# Patient Record
Sex: FEMALE | Employment: UNEMPLOYED | URBAN - METROPOLITAN AREA
[De-identification: names, ages, dates, MRNs, and addresses within clinical notes are randomized per-mention and may not be internally consistent; named-entity substitution may affect disease eponyms.]

---

## 2023-10-02 ENCOUNTER — WALK IN (OUTPATIENT)
Dept: URGENT CARE | Age: 38
End: 2023-10-02

## 2023-10-02 VITALS
RESPIRATION RATE: 18 BRPM | WEIGHT: 115 LBS | TEMPERATURE: 98 F | OXYGEN SATURATION: 99 % | DIASTOLIC BLOOD PRESSURE: 63 MMHG | HEART RATE: 68 BPM | SYSTOLIC BLOOD PRESSURE: 94 MMHG

## 2023-10-02 DIAGNOSIS — M62.830 SPASM OF BACK MUSCLES: Primary | ICD-10-CM

## 2023-10-02 PROCEDURE — 99203 OFFICE O/P NEW LOW 30 MIN: CPT | Performed by: NURSE PRACTITIONER

## 2023-10-02 RX ORDER — CYCLOBENZAPRINE HCL 5 MG
5 TABLET ORAL 3 TIMES DAILY PRN
Qty: 15 TABLET | Refills: 0 | Status: SHIPPED | OUTPATIENT
Start: 2023-10-02

## 2023-10-02 ASSESSMENT — PAIN SCALES - GENERAL: PAINLEVEL: 4

## 2023-10-02 ASSESSMENT — ENCOUNTER SYMPTOMS
NEUROLOGICAL NEGATIVE: 1
BACK PAIN: 1
GASTROINTESTINAL NEGATIVE: 1
CONSTITUTIONAL NEGATIVE: 1
RESPIRATORY NEGATIVE: 1
EYES NEGATIVE: 1
PSYCHIATRIC NEGATIVE: 1

## 2023-11-14 ENCOUNTER — HOSPITAL ENCOUNTER (EMERGENCY)
Facility: HOSPITAL | Age: 38
Discharge: HOME | End: 2023-11-14
Attending: EMERGENCY MEDICINE
Payer: COMMERCIAL

## 2023-11-14 VITALS
TEMPERATURE: 98 F | BODY MASS INDEX: 20.98 KG/M2 | DIASTOLIC BLOOD PRESSURE: 68 MMHG | SYSTOLIC BLOOD PRESSURE: 95 MMHG | HEART RATE: 57 BPM | OXYGEN SATURATION: 99 % | HEIGHT: 62 IN | WEIGHT: 114 LBS | RESPIRATION RATE: 16 BRPM

## 2023-11-14 DIAGNOSIS — K90.0 CELIAC DISEASE: ICD-10-CM

## 2023-11-14 DIAGNOSIS — R10.9 ABDOMINAL PAIN, ACUTE: Primary | ICD-10-CM

## 2023-11-14 LAB
ALBUMIN SERPL-MCNC: 4 G/DL (ref 3.4–5)
ALBUMIN/GLOB SERPL: 1.1 {RATIO} (ref 1–2)
ALP LIVER SERPL-CCNC: 69 U/L
ALT SERPL-CCNC: 35 U/L
ANION GAP SERPL CALC-SCNC: 3 MMOL/L (ref 0–18)
AST SERPL-CCNC: 14 U/L (ref 15–37)
B-HCG UR QL: NEGATIVE
BASOPHILS # BLD AUTO: 0.03 X10(3) UL (ref 0–0.2)
BASOPHILS NFR BLD AUTO: 0.5 %
BILIRUB SERPL-MCNC: 0.3 MG/DL (ref 0.1–2)
BILIRUB UR QL STRIP.AUTO: NEGATIVE
BUN BLD-MCNC: 7 MG/DL (ref 9–23)
CALCIUM BLD-MCNC: 9.6 MG/DL (ref 8.5–10.1)
CHLORIDE SERPL-SCNC: 109 MMOL/L (ref 98–112)
CO2 SERPL-SCNC: 26 MMOL/L (ref 21–32)
CREAT BLD-MCNC: 0.71 MG/DL
EGFRCR SERPLBLD CKD-EPI 2021: 113 ML/MIN/1.73M2 (ref 60–?)
EOSINOPHIL # BLD AUTO: 0.45 X10(3) UL (ref 0–0.7)
EOSINOPHIL NFR BLD AUTO: 7.9 %
ERYTHROCYTE [DISTWIDTH] IN BLOOD BY AUTOMATED COUNT: 12.4 %
GLOBULIN PLAS-MCNC: 3.7 G/DL (ref 2.8–4.4)
GLUCOSE BLD-MCNC: 90 MG/DL (ref 70–99)
GLUCOSE UR STRIP.AUTO-MCNC: NORMAL MG/DL
HCT VFR BLD AUTO: 40.8 %
HGB BLD-MCNC: 14.3 G/DL
IMM GRANULOCYTES # BLD AUTO: 0.01 X10(3) UL (ref 0–1)
IMM GRANULOCYTES NFR BLD: 0.2 %
KETONES UR STRIP.AUTO-MCNC: NEGATIVE MG/DL
LEUKOCYTE ESTERASE UR QL STRIP.AUTO: 250
LIPASE SERPL-CCNC: 74 U/L (ref 13–75)
LYMPHOCYTES # BLD AUTO: 1.77 X10(3) UL (ref 1–4)
LYMPHOCYTES NFR BLD AUTO: 31.1 %
MCH RBC QN AUTO: 30 PG (ref 26–34)
MCHC RBC AUTO-ENTMCNC: 35 G/DL (ref 31–37)
MCV RBC AUTO: 85.5 FL
MONOCYTES # BLD AUTO: 0.53 X10(3) UL (ref 0.1–1)
MONOCYTES NFR BLD AUTO: 9.3 %
NEUTROPHILS # BLD AUTO: 2.91 X10 (3) UL (ref 1.5–7.7)
NEUTROPHILS # BLD AUTO: 2.91 X10(3) UL (ref 1.5–7.7)
NEUTROPHILS NFR BLD AUTO: 51 %
NITRITE UR QL STRIP.AUTO: NEGATIVE
OSMOLALITY SERPL CALC.SUM OF ELEC: 284 MOSM/KG (ref 275–295)
PH UR STRIP.AUTO: 6.5 [PH] (ref 5–8)
PLATELET # BLD AUTO: 229 10(3)UL (ref 150–450)
POTASSIUM SERPL-SCNC: 3.9 MMOL/L (ref 3.5–5.1)
PROT SERPL-MCNC: 7.7 G/DL (ref 6.4–8.2)
PROT UR STRIP.AUTO-MCNC: NEGATIVE MG/DL
RBC # BLD AUTO: 4.77 X10(6)UL
SODIUM SERPL-SCNC: 138 MMOL/L (ref 136–145)
SP GR UR STRIP.AUTO: 1 (ref 1–1.03)
UROBILINOGEN UR STRIP.AUTO-MCNC: NORMAL MG/DL
WBC # BLD AUTO: 5.7 X10(3) UL (ref 4–11)

## 2023-11-14 PROCEDURE — 85025 COMPLETE CBC W/AUTO DIFF WBC: CPT | Performed by: EMERGENCY MEDICINE

## 2023-11-14 PROCEDURE — 81025 URINE PREGNANCY TEST: CPT

## 2023-11-14 PROCEDURE — 81001 URINALYSIS AUTO W/SCOPE: CPT | Performed by: EMERGENCY MEDICINE

## 2023-11-14 PROCEDURE — 83690 ASSAY OF LIPASE: CPT

## 2023-11-14 PROCEDURE — 99284 EMERGENCY DEPT VISIT MOD MDM: CPT

## 2023-11-14 PROCEDURE — 36415 COLL VENOUS BLD VENIPUNCTURE: CPT

## 2023-11-14 PROCEDURE — 80053 COMPREHEN METABOLIC PANEL: CPT | Performed by: EMERGENCY MEDICINE

## 2023-11-14 PROCEDURE — 85025 COMPLETE CBC W/AUTO DIFF WBC: CPT

## 2023-11-14 PROCEDURE — 80053 COMPREHEN METABOLIC PANEL: CPT

## 2023-11-14 PROCEDURE — 83690 ASSAY OF LIPASE: CPT | Performed by: EMERGENCY MEDICINE

## 2023-11-14 PROCEDURE — 81001 URINALYSIS AUTO W/SCOPE: CPT

## 2023-11-14 PROCEDURE — 99283 EMERGENCY DEPT VISIT LOW MDM: CPT

## 2023-11-14 RX ORDER — DICYCLOMINE HCL 20 MG
20 TABLET ORAL 4 TIMES DAILY PRN
Qty: 30 TABLET | Refills: 0 | Status: SHIPPED | OUTPATIENT
Start: 2023-11-14 | End: 2023-12-14

## 2023-11-14 RX ORDER — ONDANSETRON 4 MG/1
4 TABLET, ORALLY DISINTEGRATING ORAL EVERY 4 HOURS PRN
Qty: 10 TABLET | Refills: 0 | Status: SHIPPED | OUTPATIENT
Start: 2023-11-14 | End: 2023-11-21

## 2023-11-15 NOTE — ED INITIAL ASSESSMENT (HPI)
To the ED with co abdominal pain, diarrhea and nausea x few days. States took 1 dose of her 's flagyl- states did feel improvement with it. No fever, or urinary changes.

## 2024-04-25 ENCOUNTER — OFFICE VISIT (OUTPATIENT)
Facility: CLINIC | Age: 39
End: 2024-04-25
Payer: COMMERCIAL

## 2024-04-25 VITALS
SYSTOLIC BLOOD PRESSURE: 104 MMHG | BODY MASS INDEX: 20.8 KG/M2 | HEART RATE: 82 BPM | WEIGHT: 113 LBS | HEIGHT: 62 IN | DIASTOLIC BLOOD PRESSURE: 52 MMHG

## 2024-04-25 DIAGNOSIS — N80.9 ENDOMETRIOSIS: ICD-10-CM

## 2024-04-25 DIAGNOSIS — Z12.4 CERVICAL CANCER SCREENING: ICD-10-CM

## 2024-04-25 DIAGNOSIS — Z01.419 ENCOUNTER FOR WELL WOMAN EXAM WITH ROUTINE GYNECOLOGICAL EXAM: Primary | ICD-10-CM

## 2024-04-25 PROCEDURE — 87624 HPV HI-RISK TYP POOLED RSLT: CPT | Performed by: OBSTETRICS & GYNECOLOGY

## 2024-04-25 PROCEDURE — 99385 PREV VISIT NEW AGE 18-39: CPT | Performed by: OBSTETRICS & GYNECOLOGY

## 2024-04-25 PROCEDURE — 88175 CYTOPATH C/V AUTO FLUID REDO: CPT | Performed by: OBSTETRICS & GYNECOLOGY

## 2024-04-25 NOTE — PROGRESS NOTES
Tessie Hays is a 38 year old female  Patient's last menstrual period was 2024 (exact date).   Chief Complaint   Patient presents with    Fertility     Want to discuss on trying to conceive    Wellness Visit     No complaints    Other     Pt said NO to the student in the room   .Patient was having very painful menses up to 6 months ago, and was diagnosed with endometriosis by MRI. Symptoms free currently, considering TTC  She also has been seeing GI for gluten intolerance. Taking vit b12, pnv and extra iron    OBSTETRICS HISTORY:  OB History    Para Term  AB Living   0 0 0 0 0 0   SAB IAB Ectopic Multiple Live Births   0 0 0 0 0       GYNE HISTORY:  Periods regular monthly    History   Sexual Activity    Sexual activity: Not on file                 MEDICAL HISTORY:  Past Medical History:    Celiac disease (HCC)    Endometriosis    H/O pelvic ultrasound    2.1 cm heterogeneous mass posterior to the uterus which appears separate  from the uterus and could potentially represent the RT ovary which was not visualized. 2.2cm & 1.5cm hypoechoic LT adnexal masses which are measured separate from the LTt ovary which are isoechoic to the uterus & visualized ovary.1.8cm subserosal posterior uterine fibroid. 7mm endometrial stripe    History of MRI of pelvis    Findings consistent w/ focal adenomyosis LT posterior uterine body & fundus, along with  b/l ovarian endometriomas. Majority of the endometriomas are within the LT ovary; A tiny subcentimeter endometrioma is appreciated within the RT ovary. The subserosal location of the small LT posterior fundal adenomyoma raises the possibility that it represents subserosal endometriosis rather than adenomyosis.    Pap smear for cervical cancer screening    Negative per [facility]where [facility]       SURGICAL HISTORY:  History reviewed. No pertinent surgical history.    SOCIAL HISTORY:  Social History     Socioeconomic History    Marital  status:      Spouse name: Not on file    Number of children: Not on file    Years of education: Not on file    Highest education level: Not on file   Occupational History    Not on file   Tobacco Use    Smoking status: Never     Passive exposure: Never    Smokeless tobacco: Never   Vaping Use    Vaping status: Never Used   Substance and Sexual Activity    Alcohol use: Never    Drug use: Never    Sexual activity: Not on file   Other Topics Concern    Not on file   Social History Narrative    Not on file     Social Determinants of Health     Financial Resource Strain: Not on File (9/9/2022)    [facility] from [facility]     Financial Resource Strain     Financial Resource Strain: 0   Food Insecurity: Not on File (9/9/2022)    Received from [facility]     Food Insecurity     Food: 0   Transportation Needs: Not on File (9/9/2022)    Received from [facility]     Transportation Needs     Transportation: 0   Physical Activity: Not on File (9/9/2022)    Received from [facility]     Physical Activity     Physical Activity: 0   Stress: Not on File (9/9/2022)    Received from [facility]     Stress     Stress: 0   Social Connections: Not on File (9/9/2022)    Received from [facility]     Social Connections     Social Connections and Isolation: 0   Housing Stability: At Risk (8/18/2023)    Received from [facility]     Living Situation: Not on file     Housing Problems: Not on file       FAMILY HISTORY:  Family History   Problem Relation Age of Onset    Kidney Disease Father     No Known Problems Mother     No Known Problems Maternal Grandmother     No Known Problems Maternal Grandfather     No Known Problems Paternal Grandmother     No Known Problems Paternal Grandfather     Breast Cancer Neg     Prostate Cancer Neg     Ovarian Cancer Neg     Uterine Cancer Neg     Pancreatic Cancer Neg     Colon Cancer Neg     Endometriosis Neg     Infertility Neg        MEDICATIONS:    Current Outpatient  Medications:     Multiple Vitamins-Minerals (MULTIVITAL OR), Take by mouth., Disp: , Rfl:     Cyanocobalamin (VITAMIN B-12 OR), Take by mouth., Disp: , Rfl:     IRON OR, , Disp: , Rfl:     ALLERGIES:    Allergies   Allergen Reactions    Wheat Germ NAUSEA AND VOMITING         Review of Systems:  Constitutional:  Denies fatigue, night sweats, hot flashes  Eyes:  denies blurred or double vision  Cardiovascular:  denies chest pain or palpitations  Respiratory:  denies shortness of breath  Gastrointestinal:  denies heartburn, abdominal pain, diarrhea or constipation  Genitourinary:  denies dysuria, incontinence, abnormal vaginal discharge, vaginal itching  Musculoskeletal:  denies back pain.  Skin/Breast:  Denies any breast pain, lumps, or discharge.   Neurological:  denies headaches, extremity weakness or numbness.  Psychiatric: denies depression or anxiety.  Endocrine:   denies excessive thirst or urination.  Heme/Lymph:  denies history of anemia, easy bruising or bleeding.      PHYSICAL EXAM:   Constitutional: well developed, well nourished  Head/Face: normocephalic  Neck/Thyroid: thyroid symmetric, no thyromegaly, no nodules, no adenopathy  Lymphatic:no abnormal supraclavicular or axillary adenopathy is noted  Breast: normal without palpable masses, tenderness, asymmetry, nipple discharge, nipple retraction or skin changes  Abdomen:  soft, nontender, nondistended, no masses  Skin/Hair: no unusual rashes or bruises  Extremities: no edema, no cyanosis  Psychiatric:  Oriented to time, place, person and situation. Appropriate mood and affect    Pelvic Exam:  External Genitalia: normal appearance, hair distribution, and no lesions  Urethral Meatus:  normal in size, location, without lesions and prolapse  Bladder:  No fullness, masses or tenderness  Vagina:  Normal appearance without lesions, no abnormal discharge  Cervix:  Normal without tenderness on motion  Uterus: normal in size, contour, position, mobility, without  tenderness  Adnexa: normal without masses or tenderness  Perineum: normal  Anus: no hemorroids     Assessment & Plan:  Diagnoses and all orders for this visit:    Encounter for well woman exam with routine gynecological exam    Cervical cancer screening  -     Hpv High Risk , Thin Prep Collect; Future  -     ThinPrep PAP Smear B; Future    Endometriosis  -     XR HYSTEROSALPINGOGRAM (CPT=74740/15852); Future

## 2024-04-26 LAB — HPV I/H RISK 1 DNA SPEC QL NAA+PROBE: NEGATIVE

## 2024-05-01 LAB
.: NORMAL
.: NORMAL

## 2024-05-10 ENCOUNTER — HOSPITAL ENCOUNTER (OUTPATIENT)
Dept: GENERAL RADIOLOGY | Facility: HOSPITAL | Age: 39
Discharge: HOME | End: 2024-05-10
Attending: OBSTETRICS & GYNECOLOGY
Payer: COMMERCIAL

## 2024-05-10 DIAGNOSIS — N80.9 ENDOMETRIOSIS: ICD-10-CM

## 2024-05-10 PROCEDURE — 58340 CATHETER FOR HYSTEROGRAPHY: CPT | Performed by: OBSTETRICS & GYNECOLOGY

## 2024-05-10 PROCEDURE — 74740 X-RAY FEMALE GENITAL TRACT: CPT | Performed by: OBSTETRICS & GYNECOLOGY

## 2024-07-15 ENCOUNTER — HOSPITAL ENCOUNTER (EMERGENCY)
Facility: HOSPITAL | Age: 39
Discharge: HOME | End: 2024-07-15
Attending: EMERGENCY MEDICINE
Payer: COMMERCIAL

## 2024-07-15 ENCOUNTER — APPOINTMENT (OUTPATIENT)
Dept: ULTRASOUND IMAGING | Facility: HOSPITAL | Age: 39
End: 2024-07-15
Attending: EMERGENCY MEDICINE
Payer: COMMERCIAL

## 2024-07-15 VITALS
RESPIRATION RATE: 16 BRPM | HEART RATE: 55 BPM | HEIGHT: 62 IN | OXYGEN SATURATION: 100 % | WEIGHT: 110.25 LBS | DIASTOLIC BLOOD PRESSURE: 80 MMHG | BODY MASS INDEX: 20.29 KG/M2 | SYSTOLIC BLOOD PRESSURE: 108 MMHG | TEMPERATURE: 98 F

## 2024-07-15 DIAGNOSIS — R10.2 PELVIC PAIN: Primary | ICD-10-CM

## 2024-07-15 LAB
ALBUMIN SERPL-MCNC: 3.9 G/DL (ref 3.4–5)
ALBUMIN/GLOB SERPL: 1.1 {RATIO} (ref 1–2)
ALP LIVER SERPL-CCNC: 71 U/L
ALT SERPL-CCNC: 38 U/L
ANION GAP SERPL CALC-SCNC: 8 MMOL/L (ref 0–18)
AST SERPL-CCNC: 20 U/L (ref 15–37)
B-HCG UR QL: NEGATIVE
BASOPHILS # BLD AUTO: 0.05 X10(3) UL (ref 0–0.2)
BASOPHILS NFR BLD AUTO: 0.8 %
BILIRUB SERPL-MCNC: 0.3 MG/DL (ref 0.1–2)
BILIRUB UR QL STRIP.AUTO: NEGATIVE
BUN BLD-MCNC: 7 MG/DL (ref 9–23)
CALCIUM BLD-MCNC: 9.7 MG/DL (ref 8.5–10.1)
CHLORIDE SERPL-SCNC: 107 MMOL/L (ref 98–112)
CLARITY UR REFRACT.AUTO: CLEAR
CO2 SERPL-SCNC: 23 MMOL/L (ref 21–32)
COLOR UR AUTO: COLORLESS
CREAT BLD-MCNC: 0.69 MG/DL
EGFRCR SERPLBLD CKD-EPI 2021: 115 ML/MIN/1.73M2 (ref 60–?)
EOSINOPHIL # BLD AUTO: 0.13 X10(3) UL (ref 0–0.7)
EOSINOPHIL NFR BLD AUTO: 2.1 %
ERYTHROCYTE [DISTWIDTH] IN BLOOD BY AUTOMATED COUNT: 12 %
GLOBULIN PLAS-MCNC: 3.5 G/DL (ref 2.8–4.4)
GLUCOSE BLD-MCNC: 94 MG/DL (ref 70–99)
GLUCOSE UR STRIP.AUTO-MCNC: NORMAL MG/DL
HCT VFR BLD AUTO: 37.6 %
HGB BLD-MCNC: 13.4 G/DL
IMM GRANULOCYTES # BLD AUTO: 0.01 X10(3) UL (ref 0–1)
IMM GRANULOCYTES NFR BLD: 0.2 %
KETONES UR STRIP.AUTO-MCNC: NEGATIVE MG/DL
LEUKOCYTE ESTERASE UR QL STRIP.AUTO: 75
LIPASE SERPL-CCNC: 82 U/L (ref 13–75)
LYMPHOCYTES # BLD AUTO: 1.67 X10(3) UL (ref 1–4)
LYMPHOCYTES NFR BLD AUTO: 27.1 %
MCH RBC QN AUTO: 30.5 PG (ref 26–34)
MCHC RBC AUTO-ENTMCNC: 35.6 G/DL (ref 31–37)
MCV RBC AUTO: 85.5 FL
MONOCYTES # BLD AUTO: 0.45 X10(3) UL (ref 0.1–1)
MONOCYTES NFR BLD AUTO: 7.3 %
NEUTROPHILS # BLD AUTO: 3.86 X10 (3) UL (ref 1.5–7.7)
NEUTROPHILS # BLD AUTO: 3.86 X10(3) UL (ref 1.5–7.7)
NEUTROPHILS NFR BLD AUTO: 62.5 %
NITRITE UR QL STRIP.AUTO: NEGATIVE
OSMOLALITY SERPL CALC.SUM OF ELEC: 284 MOSM/KG (ref 275–295)
PH UR STRIP.AUTO: 7 [PH] (ref 5–8)
PLATELET # BLD AUTO: 227 10(3)UL (ref 150–450)
POTASSIUM SERPL-SCNC: 3.6 MMOL/L (ref 3.5–5.1)
PROT SERPL-MCNC: 7.4 G/DL (ref 6.4–8.2)
PROT UR STRIP.AUTO-MCNC: NEGATIVE MG/DL
RBC # BLD AUTO: 4.4 X10(6)UL
SODIUM SERPL-SCNC: 138 MMOL/L (ref 136–145)
SP GR UR STRIP.AUTO: 1 (ref 1–1.03)
UROBILINOGEN UR STRIP.AUTO-MCNC: NORMAL MG/DL
WBC # BLD AUTO: 6.2 X10(3) UL (ref 4–11)

## 2024-07-15 PROCEDURE — 76830 TRANSVAGINAL US NON-OB: CPT | Performed by: EMERGENCY MEDICINE

## 2024-07-15 PROCEDURE — 87086 URINE CULTURE/COLONY COUNT: CPT | Performed by: EMERGENCY MEDICINE

## 2024-07-15 PROCEDURE — 99284 EMERGENCY DEPT VISIT MOD MDM: CPT

## 2024-07-15 PROCEDURE — 96360 HYDRATION IV INFUSION INIT: CPT

## 2024-07-15 PROCEDURE — 99285 EMERGENCY DEPT VISIT HI MDM: CPT

## 2024-07-15 PROCEDURE — 85025 COMPLETE CBC W/AUTO DIFF WBC: CPT

## 2024-07-15 PROCEDURE — 81025 URINE PREGNANCY TEST: CPT

## 2024-07-15 PROCEDURE — 80053 COMPREHEN METABOLIC PANEL: CPT

## 2024-07-15 PROCEDURE — 83690 ASSAY OF LIPASE: CPT

## 2024-07-15 PROCEDURE — 76856 US EXAM PELVIC COMPLETE: CPT | Performed by: EMERGENCY MEDICINE

## 2024-07-15 PROCEDURE — 85025 COMPLETE CBC W/AUTO DIFF WBC: CPT | Performed by: EMERGENCY MEDICINE

## 2024-07-15 PROCEDURE — 81001 URINALYSIS AUTO W/SCOPE: CPT

## 2024-07-15 PROCEDURE — 80053 COMPREHEN METABOLIC PANEL: CPT | Performed by: EMERGENCY MEDICINE

## 2024-07-15 PROCEDURE — 96361 HYDRATE IV INFUSION ADD-ON: CPT

## 2024-07-15 PROCEDURE — 93975 VASCULAR STUDY: CPT | Performed by: EMERGENCY MEDICINE

## 2024-07-15 PROCEDURE — 83690 ASSAY OF LIPASE: CPT | Performed by: EMERGENCY MEDICINE

## 2024-07-15 PROCEDURE — 81001 URINALYSIS AUTO W/SCOPE: CPT | Performed by: EMERGENCY MEDICINE

## 2024-07-15 RX ORDER — KETOROLAC TROMETHAMINE 15 MG/ML
15 INJECTION, SOLUTION INTRAMUSCULAR; INTRAVENOUS ONCE
Status: DISCONTINUED | OUTPATIENT
Start: 2024-07-15 | End: 2024-07-16

## 2024-07-15 NOTE — ED INITIAL ASSESSMENT (HPI)
Pt to ER with complaints of mlq abd pain since Saturday. Pt reports vomiting 1x on Saturday. Denies and n/v. Reports normal bm this morning. Pt reports she feels \"a lot of pain in lower rectum\". Denies any urinary c/o. Denies being on menstrual period.

## 2024-07-16 ENCOUNTER — OFFICE VISIT (OUTPATIENT)
Facility: CLINIC | Age: 39
End: 2024-07-16
Payer: COMMERCIAL

## 2024-07-16 VITALS
WEIGHT: 114.38 LBS | HEIGHT: 62 IN | DIASTOLIC BLOOD PRESSURE: 60 MMHG | SYSTOLIC BLOOD PRESSURE: 96 MMHG | HEART RATE: 67 BPM | BODY MASS INDEX: 21.05 KG/M2

## 2024-07-16 DIAGNOSIS — R10.2 PELVIC PAIN: Primary | ICD-10-CM

## 2024-07-16 PROCEDURE — 99214 OFFICE O/P EST MOD 30 MIN: CPT | Performed by: OBSTETRICS & GYNECOLOGY

## 2024-07-16 RX ORDER — IBUPROFEN 200 MG
200 TABLET ORAL EVERY 6 HOURS PRN
COMMUNITY

## 2024-07-16 RX ORDER — DOXYCYCLINE HYCLATE 100 MG
100 TABLET ORAL 2 TIMES DAILY
Qty: 15 TABLET | Refills: 0 | Status: SHIPPED | OUTPATIENT
Start: 2024-07-16 | End: 2024-07-24

## 2024-07-16 NOTE — ED PROVIDER NOTES
Patient Seen in: [facility] Emergency Department      History     Chief Complaint   Patient presents with    Abdomen/Flank Pain     Stated Complaint: abd pain    Subjective:   HPI    38-year-old female presents emergency room for evaluation of lower pelvic pain, patient states that she has had intermittent pain since Saturday, states pain was in the lower pelvic area mostly on the middle to the left side and would radiate towards the rectum, states he did feel some bloating.  Denies nausea or vomiting, denies diarrhea or constipation.  Denies melena or hematochezia denies urinary symptoms.  Denies vaginal bleeding or discharge.  Patient reports she has had similar pain in the past, reports she had MRI as outpatient.  Currently she states she is feel much better and has minimal pain.  Denies any fevers or chills and denies trauma.    Objective:   Past Medical History:    Celiac disease (HCC)    Endometriosis    H/O pelvic ultrasound    2.1 cm heterogeneous mass posterior to the uterus which appears separate  from the uterus and could potentially represent the RT ovary which was not visualized. 2.2cm & 1.5cm hypoechoic LT adnexal masses which are measured separate from the LTt ovary which are isoechoic to the uterus & visualized ovary.1.8cm subserosal posterior uterine fibroid. 7mm endometrial stripe    History of MRI of pelvis    Findings consistent w/ focal adenomyosis LT posterior uterine body & fundus, along with  b/l ovarian endometriomas. Majority of the endometriomas are within the LT ovary; A tiny subcentimeter endometrioma is appreciated within the RT ovary. The subserosal location of the small LT posterior fundal adenomyoma raises the possibility that it represents subserosal endometriosis rather than adenomyosis.    Pap smear for cervical cancer screening    Negative per [facility]              History reviewed. No pertinent surgical history.             Social History      Socioeconomic History    Marital status:    Tobacco Use    Smoking status: Never     Passive exposure: Never    Smokeless tobacco: Never   Vaping Use    Vaping status: Never Used   Substance and Sexual Activity    Alcohol use: Never    Drug use: Never     Social Determinants of Health     Financial Resource Strain: Not on File (9/9/2022)    [facility] from [facility]    Financial Resource Strain     Financial Resource Strain: 0   Food Insecurity: Not on File (9/9/2022)    Received from [facility]    Food Insecurity     Food: 0   Transportation Needs: Not on File (9/9/2022)    [facility] from [facility]    Transportation Needs     Transportation: 0   Physical Activity: Not on File (9/9/2022)    Received from [facility]    Physical Activity     Physical Activity: 0   Stress: Not on File (9/9/2022)    Received from [facility]    Stress     Stress: 0   Social Connections: Not on File (9/9/2022)    Received from [facility]    Social Connections     Social Connections and Isolation: 0    Received from [facility]              Review of Systems    Positive for stated Chief Complaint: Abdomen/Flank Pain    Other systems are as noted in HPI.  Constitutional and vital signs reviewed.      All other systems reviewed and negative except as noted above.    Physical Exam     ED Triage Vitals [07/15/24 1820]   BP 95/64   Pulse 63   Resp 18   Temp 98.2 °F (36.8 °C)   Temp src Oral   SpO2 99 %   O2 Device None (Room air)       Current Vitals:   Vital Signs  BP: 108/80  Pulse: 55  Resp: 16  Temp: 98.2 °F (36.8 °C)  Temp src: Oral  MAP (mmHg): 89    Oxygen Therapy  SpO2: 100 %  O2 Device: None (Room air)            Physical Exam    GENERAL: Patient is awake, alert, well-appearing, in no acute distress.  HEENT:  no scleral icterus.  Mucous membranes are moist,  HEART: Regular rate and rhythm, no murmurs.  LUNGS: Clear to auscultation bilaterally.  No Rales, no rhonchi, no wheezing, no  stridor.  ABDOMEN: Soft, nondistended,non tender, bowel sounds are present, no rebound, no rigidity, no guarding.no pulsatile masses. No CVA tenderness  EXTREMITIES: No peripheral edema, no calf tenderness      ED Course     Labs Reviewed   COMP METABOLIC PANEL (14) - Abnormal; Notable for the following components:       Result Value    BUN 7 (*)     All other components within normal limits   LIPASE - Abnormal; Notable for the following components:    Lipase 82 (*)     All other components within normal limits   URINALYSIS WITH CULTURE REFLEX - Abnormal; Notable for the following components:    Urine Color Colorless (*)     Blood Urine Trace (*)     Leukocyte Esterase Urine 75 (*)     WBC Urine 6-10 (*)     Bacteria Urine Rare (*)     Squamous Epi. Cells Few (*)     All other components within normal limits   POCT PREGNANCY URINE - Normal   CBC WITH DIFFERENTIAL WITH PLATELET    Narrative:     The following orders were created for panel order CBC With Differential With Platelet.  Procedure                               Abnormality         Status                     ---------                               -----------         ------                     CBC W/ DIFFERENTIAL[[id number]]                              Final result                 Please view results for these tests on the individual orders.   URINE CULTURE, ROUTINE   CBC W/ DIFFERENTIAL     MRI from 4/2023  1. Findings are consistent with focal adenomyosis within the left posterior   uterine body and fundus, along with bilateral ovarian endometriomas. The   majority of the endometriomas are within the left ovary; only a tiny   subcentimeter endometrioma is appreciated within the right ovary. The subserosal   location of the small left posterior fundal adenomyoma raises the possibility   that it represents subserosal endometriosis rather than adenomyosis.                    MDM        Differential diagnosis before testing includes but not limited to  pregnancy/ectopic pregnancy, ovarian cyst, ruptured cyst, ovarian torsion, which is a medical condition that poses a threat to life/function    Radiographic images  I personally reviewed the radiographs and my individual interpretation shows ultrasound reviewed no free fluid  I also reviewed the official reports that showed indeterminate left ovarian lesion measuring up to 3.8 cm, bilaterally normal anterior wall and venous Doppler waveforms of both ovaries    External chart review included MRI from 4/16/2023 reviewed, findings consistent with focal adenomyomatosis left posterior uterine body and fundus along with bilateral ovarian endometriomas.      Course of Events during Emergency Room Visit include IV established, blood work obtained.  CBC and chemistry were unremarkable.  Urinalysis unremarkable, negative pregnancy test.  Ultrasound was performed, I discussed all results with the patient.  On reevaluation patient is pain-free, recommend follow-up with her gynecologist, may alternate ibuprofen or Tylenol as needed.  Return to ER if any change or worsening symptoms.  Patient well-appearing agrees with plan discharge good condition    Shared decision making was utilized             Disposition:    Discharge  I have discussed with the patient the results of test, differential diagnosis, treatment plan, warning signs and symptoms which should prompt immediate return.  They expressed understanding of these instructions and agrees to the following plan provided.  They were given written discharge instructions and agrees to return for any concerns and voiced understanding and all questions were answered.    Note to patient: The 21st Century Cures Act makes medical notes like these available to patients in the interest of transparency. However, this is a medical document intended as peer to peer communication. It is written in medical language and may contain abbreviations or verbiage that are unfamiliar. It may appear  blunt or direct. Medical documents are intended to carry relevant information, facts as evident, and the clinical opinion of the practitioner.                                            Medical Decision Making      Disposition and Plan     Clinical Impression:  1. Pelvic pain         Disposition:  Discharge  7/15/2024 11:22 pm    Follow-up:  Tracie Altman DO  [address] [place] [address]  [phone]    Call in 1 day(s)            Medications Prescribed:  Discharge Medication List as of 7/15/2024 11:25 PM

## 2024-07-16 NOTE — PROGRESS NOTES
Tessie Hays is a 38 year old female  Patient's last menstrual period was 2024 (exact date).   Chief Complaint   Patient presents with    Gyn Problem     Follow up ER visit (pelvic pain)   .Patient developed pelvic pain , went to ED - no clear etiology, h/o endometriosis     OBSTETRICS HISTORY:  OB History    Para Term  AB Living   0 0 0 0 0 0   SAB IAB Ectopic Multiple Live Births   0 0 0 0 0       GYNE HISTORY:  Periods regular monthly    History   Sexual Activity    Sexual activity: Yes    Partners: Male        Pap Date: 25  Pap Result Notes: neg/neg        MEDICAL HISTORY:  Past Medical History:    Celiac disease (HCC)    Endometriosis    H/O pelvic ultrasound    2.1 cm heterogeneous mass posterior to the uterus which appears separate  from the uterus and could potentially represent the RT ovary which was not visualized. 2.2cm & 1.5cm hypoechoic LT adnexal masses which are measured separate from the LTt ovary which are isoechoic to the uterus & visualized ovary.1.8cm subserosal posterior uterine fibroid. 7mm endometrial stripe    History of MRI of pelvis    Findings consistent w/ focal adenomyosis LT posterior uterine body & fundus, along with  b/l ovarian endometriomas. Majority of the endometriomas are within the LT ovary; A tiny subcentimeter endometrioma is appreciated within the RT ovary. The subserosal location of the small LT posterior fundal adenomyoma raises the possibility that it represents subserosal endometriosis rather than adenomyosis.    Pap smear for cervical cancer screening    Negative per [facility]where [facility]       SURGICAL HISTORY:  No past surgical history on file.    SOCIAL HISTORY:  Social History     Socioeconomic History    Marital status:      Spouse name: Not on file    Number of children: Not on file    Years of education: Not on file    Highest education level: Not on file   Occupational History    Not on file   Tobacco  Use    Smoking status: Never     Passive exposure: Never    Smokeless tobacco: Never   Vaping Use    Vaping status: Never Used   Substance and Sexual Activity    Alcohol use: Never    Drug use: Never    Sexual activity: Yes     Partners: Male   Other Topics Concern    Not on file   Social History Narrative    Not on file     Social Determinants of Health     Financial Resource Strain: Not on File (9/9/2022)    Received from [facility]    Financial Resource Strain     Financial Resource Strain: 0   Food Insecurity: Not on File (9/9/2022)    Received from [facility]    Food Insecurity     Food: 0   Transportation Needs: Not on File (9/9/2022)    Received from [facility]    Transportation Needs     Transportation: 0   Physical Activity: Not on File (9/9/2022)    Received from [facility]    Physical Activity     Physical Activity: 0   Stress: Not on File (9/9/2022)    Received from [facility]    Stress     Stress: 0   Social Connections: Not on File (9/9/2022)    Received from [facility]    Social Connections     Social Connections and Isolation: 0   Housing Stability: At Risk (8/18/2023)    Received from [facility]     Living Situation: Not on file     Housing Problems: Not on file       FAMILY HISTORY:  Family History   Problem Relation Age of Onset    Kidney Disease Father     No Known Problems Mother     No Known Problems Maternal Grandmother     No Known Problems Maternal Grandfather     No Known Problems Paternal Grandmother     No Known Problems Paternal Grandfather     Breast Cancer Neg     Prostate Cancer Neg     Ovarian Cancer Neg     Uterine Cancer Neg     Pancreatic Cancer Neg     Colon Cancer Neg     Endometriosis Neg     Infertility Neg        MEDICATIONS:    Current Outpatient Medications:     ibuprofen 200 MG Oral Tab, Take 1 tablet (200 mg total) by mouth every 6 (six) hours as needed for Pain., Disp: , Rfl:     Doxycycline Hyclate 100 MG Oral Tab, Take 1 tablet (100 mg  total) by mouth 2 (two) times daily for 8 days. 2 tablets initially then 1 tablet orally twice daily, Disp: 15 tablet, Rfl: 0    Multiple Vitamins-Minerals (MULTIVITAL OR), Take by mouth., Disp: , Rfl:     Cyanocobalamin (VITAMIN B-12 OR), Take by mouth., Disp: , Rfl:     IRON OR, Take 1 tablet by mouth daily., Disp: , Rfl:     ALLERGIES:    Allergies   Allergen Reactions    Wheat Germ NAUSEA AND VOMITING         PHYSICAL EXAM:   Pelvic Exam:  External Genitalia: normal appearance, hair distribution, and no lesions  Urethral Meatus:  normal in size, location, without lesions and prolapse  Bladder:  No fullness, masses or tenderness  Vagina:  Normal appearance without lesions, no abnormal discharge  Cervix:  Normal without tenderness on motion  Uterus: normal in size, contour, position, mobility,  tenderness on palpation  Adnexa: normal without masses or tenderness  Perineum: normal  Anus: no hemorroids            MEASUREMENTS:          Uterus Length:                      7.12 cm x 4.34 cm x 4.64 cm          Endometrium Thickness:      15 mm  Right Ovary:                         2.84 cm x 2.01 cm x 1.68 cm  Left Ovary:                           1.87 cm x 1.89 cm x 1.42 cm     FINDINGS:     PELVIS    UTERUS:  Unremarkable appearance.  RIGHT OVARY:  There is arterial and venous Doppler flow to the right ovary which measures 2.6 x 1.7 x 2.4 cm.  There is a simple 2.6 cm cyst within the right ovary.  LEFT OVARY:  The left ovary measures 5.0 x 3.2 x 3.8 centimeters.  There is a homogeneous isoechoic left ovarian lesion measuring approximately 3.4 x 3.2 x 3.8   CUL-DE-SAC:  Negative.     DOPPLER WAVE FORMS  FLOW:  There is normal arterial and venous Doppler wave forms in both ovaries.  The spectral analysis is within normal limits.  OTHER:  Negative.                   Impression   CONCLUSION:  Indeterminate left ovarian lesion measuring up to 3.8 centimeters in size.  There is homogeneous isoechoic echogenicity without  significant associated Doppler flow.  Findings may be related to endometrioma versus hemorrhagic/proteinaceous  cyst.  Consider further evaluation with contrast enhanced pelvic MRI, as clinically indicated.  Correlation to prior imaging if available is recommended.     Discussed left ovarian cyst - likley endometrioma, unlikely cause of her discomfort   Ovulation could have triggered the discomfort    Assessment & Plan:  1. Pelvic pain  - rx doxycycline 100 mg sent

## 2024-09-09 ENCOUNTER — OFFICE VISIT (OUTPATIENT)
Dept: OBGYN CLINIC | Facility: CLINIC | Age: 39
End: 2024-09-09
Payer: COMMERCIAL

## 2024-09-09 ENCOUNTER — LAB ENCOUNTER (OUTPATIENT)
Dept: LAB | Age: 39
End: 2024-09-09
Payer: COMMERCIAL

## 2024-09-09 VITALS
BODY MASS INDEX: 20.8 KG/M2 | HEART RATE: 76 BPM | HEIGHT: 62 IN | SYSTOLIC BLOOD PRESSURE: 92 MMHG | WEIGHT: 113 LBS | DIASTOLIC BLOOD PRESSURE: 60 MMHG

## 2024-09-09 DIAGNOSIS — R10.2 PELVIC PAIN: Primary | ICD-10-CM

## 2024-09-09 DIAGNOSIS — R10.2 PELVIC PAIN: ICD-10-CM

## 2024-09-09 LAB
B-HCG SERPL-ACNC: 8782.9 MIU/ML
CONTROL LINE PRESENT WITH A CLEAR BACKGROUND (YES/NO): YES YES/NO
KIT LOT #: NORMAL NUMERIC
PREGNANCY TEST, URINE: POSITIVE
PROGEST SERPL-MCNC: 24.71 NG/ML

## 2024-09-09 PROCEDURE — 84144 ASSAY OF PROGESTERONE: CPT

## 2024-09-09 PROCEDURE — 84702 CHORIONIC GONADOTROPIN TEST: CPT

## 2024-09-09 NOTE — PROGRESS NOTES
Tessie Hays is a 38 year old female  Patient's last menstrual period was 2024 (exact date).   Chief Complaint   Patient presents with    Gyn Problem     Patient reports she took a home pregnancy test on 24, it was positive.   - Patient reports she has been having period like cramps for one week, it is more serve at night time.   -LMP: 8/3/24...spotting very light on 24     .  She is 5 wk 2 days today. She is having pelvic cramping, denies vaginal bleeding.     OBSTETRICS HISTORY:  OB History    Para Term  AB Living   0 0 0 0 0 0   SAB IAB Ectopic Multiple Live Births   0 0 0 0 0       GYNE HISTORY:  Periods regular monthly    History   Sexual Activity    Sexual activity: Yes    Partners: Male        Hx Prior Abnormal Pap: No  Pap Date: 24  Pap Result Notes: Negative        MEDICAL HISTORY:  Past Medical History:    Celiac disease (HCC)    Endometriosis    H/O pelvic ultrasound    2.1 cm heterogeneous mass posterior to the uterus which appears separate  from the uterus and could potentially represent the RT ovary which was not visualized. 2.2cm & 1.5cm hypoechoic LT adnexal masses which are measured separate from the LTt ovary which are isoechoic to the uterus & visualized ovary.1.8cm subserosal posterior uterine fibroid. 7mm endometrial stripe    History of MRI of pelvis    Findings consistent w/ focal adenomyosis LT posterior uterine body & fundus, along with  b/l ovarian endometriomas. Majority of the endometriomas are within the LT ovary; A tiny subcentimeter endometrioma is appreciated within the RT ovary. The subserosal location of the small LT posterior fundal adenomyoma raises the possibility that it represents subserosal endometriosis rather than adenomyosis.    Pap smear for cervical cancer screening    Negative per Care [facility]where [facility]       SURGICAL HISTORY:  History reviewed. No pertinent surgical history.    SOCIAL HISTORY:  Social History      Socioeconomic History    Marital status:      Spouse name: Not on file    Number of children: Not on file    Years of education: Not on file    Highest education level: Not on file   Occupational History    Not on file   Tobacco Use    Smoking status: Never     Passive exposure: Never    Smokeless tobacco: Never   Vaping Use    Vaping status: Never Used   Substance and Sexual Activity    Alcohol use: Never    Drug use: Never    Sexual activity: Yes     Partners: Male   Other Topics Concern    Not on file   Social History Narrative    Not on file     Social Determinants of Health     Financial Resource Strain: Not on File (9/9/2022)    [facility] from [facility]    Financial Resource Strain     Financial Resource Strain: 0   Food Insecurity: Not on File (9/9/2022)    Received from [facility]    Food Insecurity     Food: 0   Transportation Needs: Not on File (9/9/2022)    Received from [facility]    Transportation Needs     Transportation: 0   Physical Activity: Not on File (9/9/2022)    [facility] from [facility]    Physical Activity     Physical Activity: 0   Stress: Not on File (9/9/2022)    Received from [facility]    Stress     Stress: 0   Social Connections: Not on File (9/9/2022)    Received from [facility]    Social Connections     Social Connections and Isolation: 0   Housing Stability: At Risk (8/18/2023)    Received from [facility]     Living Situation: Not on file     Housing Problems: Not on file       FAMILY HISTORY:  Family History   Problem Relation Age of Onset    Kidney Disease Father     No Known Problems Mother     No Known Problems Maternal Grandmother     No Known Problems Maternal Grandfather     No Known Problems Paternal Grandmother     No Known Problems Paternal Grandfather     Breast Cancer Neg     Prostate Cancer Neg     Ovarian Cancer Neg     Uterine Cancer Neg     Pancreatic Cancer Neg     Colon Cancer Neg     Endometriosis Neg     Infertility Neg         MEDICATIONS:    Current Outpatient Medications:     Multiple Vitamins-Minerals (MULTIVITAL OR), Take by mouth., Disp: , Rfl:     Cyanocobalamin (VITAMIN B-12 OR), Take by mouth., Disp: , Rfl:     IRON OR, Take 1 tablet by mouth daily., Disp: , Rfl:     ibuprofen 200 MG Oral Tab, Take 1 tablet (200 mg total) by mouth every 6 (six) hours as needed for Pain., Disp: , Rfl:     ALLERGIES:    Allergies   Allergen Reactions    Wheat Germ NAUSEA AND VOMITING         PHYSICAL EXAM:   Pelvic Exam:  External Genitalia: normal appearance, hair distribution, and no lesions  Urethral Meatus:  normal in size, location, without lesions and prolapse  Bladder:  No fullness, masses or tenderness  Vagina:  Normal appearance without lesions, no abnormal discharge  Cervix:  Normal without tenderness on motion  Uterus: normal in size, contour, position, mobility, without tenderness  Adnexa: normal without masses or tenderness  Perineum: normal  Anus: no hemorroids     Assessment & Plan:  1. Pelvic pain    - HCG, Beta Subunit, Quant; Future  - Progesterone; Future

## 2024-09-10 DIAGNOSIS — O20.9 VAGINAL BLEEDING AFFECTING EARLY PREGNANCY (HCC): Primary | ICD-10-CM

## 2024-09-11 NOTE — PROGRESS NOTES
Pt aware of results & recommendations to repeat HCG in 28 hours. She is unable to go today so she will go back to the lab tomorrow. Verbalized understanding.

## 2024-09-12 ENCOUNTER — LAB ENCOUNTER (OUTPATIENT)
Dept: LAB | Age: 39
End: 2024-09-12
Payer: COMMERCIAL

## 2024-09-12 DIAGNOSIS — O20.9 VAGINAL BLEEDING AFFECTING EARLY PREGNANCY (HCC): ICD-10-CM

## 2024-09-12 LAB — B-HCG SERPL-ACNC: ABNORMAL MIU/ML

## 2024-09-12 PROCEDURE — 84702 CHORIONIC GONADOTROPIN TEST: CPT

## 2024-09-15 DIAGNOSIS — Z32.01 PREGNANCY EXAMINATION OR TEST, POSITIVE RESULT (HCC): ICD-10-CM

## 2024-09-15 DIAGNOSIS — R10.2 PELVIC PAIN: Primary | ICD-10-CM

## 2024-09-16 ENCOUNTER — PATIENT MESSAGE (OUTPATIENT)
Dept: OBGYN CLINIC | Facility: CLINIC | Age: 39
End: 2024-09-16

## 2024-09-17 ENCOUNTER — LAB ENCOUNTER (OUTPATIENT)
Dept: LAB | Age: 39
End: 2024-09-17
Payer: COMMERCIAL

## 2024-09-17 DIAGNOSIS — R10.2 PELVIC PAIN: ICD-10-CM

## 2024-09-17 LAB — B-HCG SERPL-ACNC: ABNORMAL MIU/ML

## 2024-09-17 PROCEDURE — 84702 CHORIONIC GONADOTROPIN TEST: CPT

## 2024-09-17 NOTE — TELEPHONE ENCOUNTER
From: Tessie Hays  To: Zofia [email or web address]  Sent: 9/16/2024 4:38 PM CDT  Subject: HCG blood test    Hey Zofia,    I did the HCG blood test twice in previous weeks and it looks like the count is increasing. Do I still need to take further tests?    I received a call from the lab to go for further HCG test today or tomorrow. Is there something concerning?    Thank you for your time.     Regards  Tessie Hays

## 2024-09-18 NOTE — PROGRESS NOTES
Pt aware of results & recommendations to call if any vaginal bleeding or pelvic pain. Verbalized understanding.

## 2024-09-18 NOTE — TELEPHONE ENCOUNTER
Patient's partner testing positive for COVID.    Advised follow CDC COVID guidelines, isolation for 10d. Patient get tested, if positive to call office.     Patient verbalized understanding, agreed to and intend to comply with plan of care.

## 2024-09-21 ENCOUNTER — HOSPITAL ENCOUNTER (EMERGENCY)
Facility: HOSPITAL | Age: 39
Discharge: HOME | End: 2024-09-21
Attending: EMERGENCY MEDICINE
Payer: COMMERCIAL

## 2024-09-21 VITALS
BODY MASS INDEX: 21.16 KG/M2 | RESPIRATION RATE: 18 BRPM | SYSTOLIC BLOOD PRESSURE: 92 MMHG | DIASTOLIC BLOOD PRESSURE: 78 MMHG | OXYGEN SATURATION: 100 % | TEMPERATURE: 98 F | HEART RATE: 80 BPM | WEIGHT: 115 LBS | HEIGHT: 62 IN

## 2024-09-21 DIAGNOSIS — U07.1 COVID-19 VIRUS INFECTION: Primary | ICD-10-CM

## 2024-09-21 LAB
FLUAV + FLUBV RNA SPEC NAA+PROBE: NEGATIVE
FLUAV + FLUBV RNA SPEC NAA+PROBE: NEGATIVE
RSV RNA SPEC NAA+PROBE: NEGATIVE
SARS-COV-2 RNA RESP QL NAA+PROBE: DETECTED

## 2024-09-21 PROCEDURE — 0241U SARS-COV-2/FLU A AND B/RSV BY PCR (GENEXPERT): CPT | Performed by: EMERGENCY MEDICINE

## 2024-09-21 PROCEDURE — 87430 STREP A AG IA: CPT | Performed by: EMERGENCY MEDICINE

## 2024-09-21 PROCEDURE — 87430 STREP A AG IA: CPT

## 2024-09-21 PROCEDURE — 0241U SARS-COV-2/FLU A AND B/RSV BY PCR (GENEXPERT): CPT

## 2024-09-21 PROCEDURE — 99283 EMERGENCY DEPT VISIT LOW MDM: CPT

## 2024-09-21 NOTE — ED PROVIDER NOTES
Patient Seen in: [facility] Emergency Department      History     Chief Complaint   Patient presents with    Fever     fever, body aches, sore throat,  was covid positive    positive home pregnancy test, hcg levels indicate 7 weeks pregnant, pending 1st OB visit next week,      Stated Complaint: fever, body aches, sore throat, positive home pregnancy test, hcg levels indica*    Subjective:   HPI    38-year-old female approximately 7 weeks pregnant presents with low-grade fever, sore throat, body aches and cough starting 3 days ago.   states that he had COVID last week patient took a home COVID test yesterday that was negative.  Patient reports some nausea with 1 episode of vomiting yesterday.  He has been able to tolerate liquids well since then.  Denies abdominal pain or bleeding.    Objective:   No pertinent past medical history.            No pertinent past surgical history.              No pertinent social history.            Review of Systems    Positive for stated Chief Complaint: Fever (fever, body aches, sore throat,  was covid positive / positive home pregnancy test, hcg levels indicate 7 weeks pregnant, pending 1st OB visit next week, )    Other systems are as noted in HPI.  Constitutional and vital signs reviewed.      All other systems reviewed and negative except as noted above.    Physical Exam     ED Triage Vitals [09/21/24 0515]   BP 96/68   Pulse 94   Resp 18   Temp 98.1 °F (36.7 °C)   Temp src Oral   SpO2 98 %   O2 Device None (Room air)       Current Vitals:   Vital Signs  BP: 96/68  Pulse: 94  Resp: 18  Temp: 98.1 °F (36.7 °C)  Temp src: Oral    Oxygen Therapy  SpO2: 98 %  O2 Device: None (Room air)            Physical Exam  Vitals and nursing note reviewed.   Constitutional:       Appearance: She is well-developed.   HENT:      Head: Normocephalic and atraumatic.      Mouth/Throat:      Mouth: Mucous membranes are moist.      Pharynx: No oropharyngeal exudate.       Comments: Posterior oropharynx with mild injection  Eyes:      General: No scleral icterus.     Conjunctiva/sclera: Conjunctivae normal.   Cardiovascular:      Rate and Rhythm: Normal rate and regular rhythm.   Pulmonary:      Effort: Pulmonary effort is normal.      Breath sounds: Normal breath sounds.   Skin:     General: Skin is warm and dry.   Neurological:      General: No focal deficit present.      Mental Status: She is alert and oriented to person, place, and time.      Cranial Nerves: No cranial nerve deficit.      Motor: No weakness.   Psychiatric:         Mood and Affect: Mood normal.         Behavior: Behavior normal.               ED Course     Labs Reviewed   SARS-COV-2/FLU A AND B/RSV BY PCR (GENEXPERT) - Abnormal; Notable for the following components:       Result Value    SARS-CoV-2 (COVID-19) - (GeneXpert) Detected (*)     All other components within normal limits    Narrative:     This test is intended for the qualitative detection and differentiation of SARS-CoV-2, influenza A, influenza B, and respiratory syncytial virus (RSV) viral RNA in nasopharyngeal or nares swabs from individuals suspected of respiratory viral infection consistent with COVID-19 by their healthcare provider. Signs and symptoms of respiratory viral infection due to SARS-CoV-2, influenza, and RSV can be similar.    Test performed using the Xpert Xpress SARS-CoV-2/FLU/RSV (real time RT-PCR)  assay on the GeneXpert instrument, [employer], [place] [address].   This test is being used under the Food and Drug Administration's Emergency Use Authorization.    The authorized Fact Sheet for Healthcare Providers for this assay is available upon request from the laboratory.   RAPID STREP A SCREEN (LC) - Normal    Narrative:     A confirmatory culture is recommended if clinically indicated.                      MDM      38-year-old female approximately 7 weeks pregnant presents with low-grade fever, sore throat, body aches and cough  starting 3 days ago.      Differential includes but is not limited to COVID, influenza, other viral respiratory infection, strep pharyngitis    COVID PCR is positive.  Rapid strep negative.    Patient is afebrile with normal vital signs.  SpO2 was in the upper 90s.  Appropriate for continued outpatient management.  Instructed on symptomatic supportive care.  Encouraged to push p.o. fluids.  Return precaution discussed.                             Medical Decision Making  Amount and/or Complexity of Data Reviewed  Independent Historian: spouse     Details: See HPI  Labs: ordered. Decision-making details documented in ED Course.    Risk  OTC drugs.        Disposition and Plan     Clinical Impression:  1. COVID-19 virus infection         Disposition:  Discharge  9/21/2024  6:39 am    Follow-up:  Elana Bonds DO  [facility] [address], [address] [place] [id number]  [phone]    Call in 2 day(s)            Medications Prescribed:  Current Discharge Medication List

## 2024-09-21 NOTE — ED INITIAL ASSESSMENT (HPI)
Patient pregnant, patient sore throat.  is COVID positive. No tylenol at home, G1 about 7 weeks pregnant.

## 2024-09-26 ENCOUNTER — OFFICE VISIT (OUTPATIENT)
Facility: CLINIC | Age: 39
End: 2024-09-26
Payer: COMMERCIAL

## 2024-09-26 ENCOUNTER — ULTRASOUND ENCOUNTER (OUTPATIENT)
Facility: CLINIC | Age: 39
End: 2024-09-26
Payer: COMMERCIAL

## 2024-09-26 VITALS
BODY MASS INDEX: 20.68 KG/M2 | HEART RATE: 70 BPM | DIASTOLIC BLOOD PRESSURE: 72 MMHG | WEIGHT: 112.38 LBS | SYSTOLIC BLOOD PRESSURE: 110 MMHG | HEIGHT: 62 IN

## 2024-09-26 DIAGNOSIS — O36.80X0 PREGNANCY WITH INCONCLUSIVE FETAL VIABILITY, SINGLE OR UNSPECIFIED FETUS (HCC): Primary | ICD-10-CM

## 2024-09-26 PROCEDURE — 76830 TRANSVAGINAL US NON-OB: CPT | Performed by: OBSTETRICS & GYNECOLOGY

## 2024-09-26 PROCEDURE — 99214 OFFICE O/P EST MOD 30 MIN: CPT | Performed by: OBSTETRICS & GYNECOLOGY

## 2024-09-27 ENCOUNTER — TELEPHONE (OUTPATIENT)
Facility: CLINIC | Age: 39
End: 2024-09-27

## 2024-09-27 DIAGNOSIS — O36.80X0 PREGNANCY WITH INCONCLUSIVE FETAL VIABILITY, SINGLE OR UNSPECIFIED FETUS (HCC): Primary | ICD-10-CM

## 2024-09-27 NOTE — TELEPHONE ENCOUNTER
Spoke with pt's spouse. Aware ultrasound order has been placed. Central scheduling number given. Verbalized understanding.   Jovany

## 2024-09-27 NOTE — TELEPHONE ENCOUNTER
Spoke with patient's . HIPAA confirmed. Patient is not currently feeling any cramping and is not bleeding. In order to make their decision on how they want to proceed, they would like a second ultrasound done with the hospital's radiology team. Advised patient I would discuss their concers with Dr. Altman. Understanding verbalized.     Routed to provider to review.

## 2024-09-27 NOTE — PROGRESS NOTES
Tessie Hays is a 38 year old female  Patient's last menstrual period was 2024 (exact date).   Chief Complaint   Patient presents with    Gyn Problem     Discuss ultrasound    .  Patient is 7w5d pregnant by LMP, US today shows non viable pregnancy. Patient has no bleeding, occasional cramping    OBSTETRICS HISTORY:  OB History    Para Term  AB Living   1 0 0 0 0 0   SAB IAB Ectopic Multiple Live Births   0 0 0 0 0      # Outcome Date GA Lbr Edwin/2nd Weight Sex Type Anes PTL Lv   1                 GYNE HISTORY:  Periods regular monthly    History   Sexual Activity    Sexual activity: Yes    Partners: Male                 MEDICAL HISTORY:  Past Medical History:    Celiac disease (HCC)    Endometriosis    H/O pelvic ultrasound    2.1 cm heterogeneous mass posterior to the uterus which appears separate  from the uterus and could potentially represent the RT ovary which was not visualized. 2.2cm & 1.5cm hypoechoic LT adnexal masses which are measured separate from the LTt ovary which are isoechoic to the uterus & visualized ovary.1.8cm subserosal posterior uterine fibroid. 7mm endometrial stripe    History of MRI of pelvis    Findings consistent w/ focal adenomyosis LT posterior uterine body & fundus, along with  b/l ovarian endometriomas. Majority of the endometriomas are within the LT ovary; A tiny subcentimeter endometrioma is appreciated within the RT ovary. The subserosal location of the small LT posterior fundal adenomyoma raises the possibility that it represents subserosal endometriosis rather than adenomyosis.    Pap smear for cervical cancer screening    Negative per Care Everywhere [facility]       SURGICAL HISTORY:  No past surgical history on file.    SOCIAL HISTORY:  Social History     Socioeconomic History    Marital status:      Spouse name: Not on file    Number of children: Not on file    Years of education: Not on file    Highest education level: Not  on file   Occupational History    Not on file   Tobacco Use    Smoking status: Never     Passive exposure: Never    Smokeless tobacco: Never   Vaping Use    Vaping status: Never Used   Substance and Sexual Activity    Alcohol use: Never    Drug use: Never    Sexual activity: Yes     Partners: Male   Other Topics Concern    Caffeine Concern No    Exercise No    Seat Belt No    Special Diet No    Stress Concern No    Weight Concern No   Social History Narrative    Not on file     Social Determinants of Health     Financial Resource Strain: Low Risk  (9/26/2024)    Financial Resource Strain     Difficulty of Paying Living Expenses: Not very hard     Med Affordability: No   Food Insecurity: No Food Insecurity (9/26/2024)    Food Insecurity     Food Insecurity: Never true   Transportation Needs: No Transportation Needs (9/26/2024)    Transportation Needs     Lack of Transportation: No     Car Seat: Not on file   Physical Activity: Not on File (9/9/2022)    [facility] from [facility]    Physical Activity     Physical Activity: 0   Stress: No Stress Concern Present (9/26/2024)    Stress     Feeling of Stress : No   Social Connections: Not on File (9/21/2024)    Received from [facility]    Social Connections     Connectedness: 0   Housing Stability: Low Risk  (9/26/2024)    Housing Stability     Housing Instability: No     Housing Instability Emergency: Not on file     Crib or Bassinette: Not on file       FAMILY HISTORY:  Family History   Problem Relation Age of Onset    Kidney Disease Father     No Known Problems Mother     No Known Problems Maternal Grandmother     No Known Problems Maternal Grandfather     No Known Problems Paternal Grandmother     No Known Problems Paternal Grandfather     Breast Cancer Neg     Prostate Cancer Neg     Ovarian Cancer Neg     Uterine Cancer Neg     Pancreatic Cancer Neg     Colon Cancer Neg     Endometriosis Neg     Infertility Neg        MEDICATIONS:    Current Outpatient Medications:      Multiple Vitamins-Minerals (MULTIVITAL OR), Take by mouth., Disp: , Rfl:     Cyanocobalamin (VITAMIN B-12 OR), Take by mouth., Disp: , Rfl:     IRON OR, Take 1 tablet by mouth daily., Disp: , Rfl:     ibuprofen 200 MG Oral Tab, Take 1 tablet (200 mg total) by mouth every 6 (six) hours as needed for Pain. (Patient not taking: Reported on 9/21/2024), Disp: , Rfl:     ALLERGIES:    Allergies   Allergen Reactions    Wheat Germ NAUSEA AND VOMITING         PHYSICAL EXAM:   Pelvic Exam:  External Genitalia: normal appearance, hair distribution, and no lesions  Urethral Meatus:  normal in size, location, without lesions and prolapse  Bladder:  No fullness, masses or tenderness  Vagina:  Normal appearance without lesions, no abnormal discharge  Cervix:  Normal without tenderness on motion  Uterus: normal in size, contour, position, mobility, without tenderness  Adnexa: normal without masses or tenderness  Perineum: normal  Anus: no hemorroids     NO VIABLE IUP SEEN   LMP= 08/03/2024 =7W5D   INTRAUTERINE GESTATIONAL SAC( MSD=1.92 CM =6W3D) SEEN WITHOUT FETAL POLE OR YOLK SAC.   RT OVARY SEEN WITH CORPUS LUTEUM   IN LT ADNEXA, MASS IS SEEN (5.2X3.7X4.4CM) WITH LOW LEVEL INTERNAL ECHOES AND PERIPHERAL COLOR   FLOW   SUBSEROSAL FIBROID (1.9X1.6X1.7 CM) SEEN POSTERIOR FÉLIX   ANOTHER HETEROGENOUS MASS(2.4X2.2X2.8CM) WITH COLOR FLOW SEEN POSTERIOR /LT WALL OF THE   UTERUS. ? FIBROID   OVARIES ARE SEEN   CERVIX CLOSED     Discussed US findings - short term follow up in 2 months on ovarian cyst  Discussed  SAB - expectant management vs D&C, patient will consider     Assessment & Plan:  1. Pregnancy with inconclusive fetal viability, single or unspecified fetus (HCC)    - Transvaginal US GYNE Only [47216]; Future  - Transvaginal US GYNE Only [[id number]]

## 2024-09-27 NOTE — TELEPHONE ENCOUNTER
Patient spouse calling to state the patient needs another Ultrasound scheduled to confirm the pregnancy is non viable. Please advise.

## 2024-10-04 ENCOUNTER — PATIENT MESSAGE (OUTPATIENT)
Facility: CLINIC | Age: 39
End: 2024-10-04

## 2024-10-04 NOTE — TELEPHONE ENCOUNTER
Spoke to patient's , HIPAA verified. Patient's  advised they have their ultrasound scheduled with the hospital on 10/13/24. Patient and patient's  were hoping to go ahead and schedule their D&C just in case the ultrasound on the 13 th comes back as nonviable. Advised patient's  I would send a message to our surgery scheduler to see if she could get you on the schedule soon after the ultrasound on the 13 th. I also advised that we do have some openings in the office next week, if they wanted to schedule it in the office for sooner. They will think about it and let us know.     Routing message to Rocío.

## 2024-10-07 ENCOUNTER — TELEPHONE (OUTPATIENT)
Facility: CLINIC | Age: 39
End: 2024-10-07

## 2024-10-07 DIAGNOSIS — O36.80X0 PREGNANCY WITH INCONCLUSIVE FETAL VIABILITY, SINGLE OR UNSPECIFIED FETUS (HCC): Primary | ICD-10-CM

## 2024-10-07 NOTE — TELEPHONE ENCOUNTER
Regarding: D&C Procedure Inquiry  Contact: [phone]  ----- Message from Constance Baer RN sent at 10/4/2024  2:47 PM CDT -----  Hi Rocío, Patient received ultrasound on 9/26 showing no viable IUP. They are getting a second one done on 10/13 for a second opinion. However, if it comes back as non viable IUP again, they would like to have their D&C scheduled for soon after. Would you be able to go ahead and schedule them for their D&C? Can you please call patient's , Michael Brandon [phone]. Thank you!     ----- Message from Tessie Hays to Tracie Altman DO sent at 10/4/2024  2:25 PM -----   I have a follow-up scan on 13-October for my pregnancy after last visit.   However, if after this scan things remain the same then you suggested doing  D&C procedure . I heard that this procedure can only be done until 13th week so if this procedure is is required then I don't want to delay to risk anything.    Can I get an appointment so that this procedure is not delayed?    Please let me know if we can book this appointment  right after 13-Oct so that we are ready for every situation.    Thanks  Tessie

## 2024-10-09 DIAGNOSIS — O36.80X0: Primary | ICD-10-CM

## 2024-10-12 ENCOUNTER — LABORATORY ENCOUNTER (OUTPATIENT)
Dept: LAB | Age: 39
End: 2024-10-12
Attending: OBSTETRICS & GYNECOLOGY
Payer: COMMERCIAL

## 2024-10-13 ENCOUNTER — HOSPITAL ENCOUNTER (OUTPATIENT)
Dept: ULTRASOUND IMAGING | Age: 39
Discharge: HOME | End: 2024-10-13
Attending: OBSTETRICS & GYNECOLOGY
Payer: COMMERCIAL

## 2024-10-13 ENCOUNTER — HOSPITAL ENCOUNTER (OUTPATIENT)
Dept: ULTRASOUND IMAGING | Age: 39
End: 2024-10-13
Attending: OBSTETRICS & GYNECOLOGY
Payer: COMMERCIAL

## 2024-10-13 DIAGNOSIS — O36.80X0 PREGNANCY WITH INCONCLUSIVE FETAL VIABILITY, SINGLE OR UNSPECIFIED FETUS (HCC): ICD-10-CM

## 2024-10-13 PROCEDURE — 76801 OB US < 14 WKS SINGLE FETUS: CPT | Performed by: OBSTETRICS & GYNECOLOGY

## 2024-10-13 PROCEDURE — 76817 TRANSVAGINAL US OBSTETRIC: CPT | Performed by: OBSTETRICS & GYNECOLOGY

## 2024-10-15 DIAGNOSIS — O36.80X0: ICD-10-CM

## 2024-10-15 DIAGNOSIS — O02.1 MISSED ABORTION (HCC): Primary | ICD-10-CM

## 2024-10-17 NOTE — PROGRESS NOTES
Spoke with patient and advised of results. Patient states she had already reviewed results and understood them. Patient to continue with D&C as scheduled.  DISCHARGE

## 2024-10-22 ENCOUNTER — LABORATORY ENCOUNTER (OUTPATIENT)
Dept: LAB | Facility: HOSPITAL | Age: 39
End: 2024-10-22
Attending: OBSTETRICS & GYNECOLOGY
Payer: COMMERCIAL

## 2024-10-22 DIAGNOSIS — Z01.818 PRE-OP TESTING: ICD-10-CM

## 2024-10-22 LAB
ANTIBODY SCREEN: NEGATIVE
ERYTHROCYTE [DISTWIDTH] IN BLOOD BY AUTOMATED COUNT: 12.2 %
HCT VFR BLD AUTO: 35.7 %
HGB BLD-MCNC: 13 G/DL
MCH RBC QN AUTO: 30.7 PG (ref 26–34)
MCHC RBC AUTO-ENTMCNC: 36.4 G/DL (ref 31–37)
MCV RBC AUTO: 84.4 FL
PLATELET # BLD AUTO: 176 10(3)UL (ref 150–450)
RBC # BLD AUTO: 4.23 X10(6)UL
RH BLOOD TYPE: POSITIVE
WBC # BLD AUTO: 5.6 X10(3) UL (ref 4–11)

## 2024-10-22 PROCEDURE — 86900 BLOOD TYPING SEROLOGIC ABO: CPT

## 2024-10-22 PROCEDURE — 86901 BLOOD TYPING SEROLOGIC RH(D): CPT

## 2024-10-22 PROCEDURE — 85027 COMPLETE CBC AUTOMATED: CPT

## 2024-10-22 PROCEDURE — 36415 COLL VENOUS BLD VENIPUNCTURE: CPT

## 2024-10-22 PROCEDURE — 86850 RBC ANTIBODY SCREEN: CPT

## 2024-10-25 ENCOUNTER — TELEPHONE (OUTPATIENT)
Facility: CLINIC | Age: 39
End: 2024-10-25

## 2024-10-25 ENCOUNTER — APPOINTMENT (OUTPATIENT)
Dept: ULTRASOUND IMAGING | Facility: HOSPITAL | Age: 39
End: 2024-10-25
Attending: STUDENT IN AN ORGANIZED HEALTH CARE EDUCATION/TRAINING PROGRAM
Payer: COMMERCIAL

## 2024-10-25 ENCOUNTER — ANESTHESIA (OUTPATIENT)
Dept: SURGERY | Facility: HOSPITAL | Age: 39
End: 2024-10-25
Payer: COMMERCIAL

## 2024-10-25 ENCOUNTER — HOSPITAL ENCOUNTER (EMERGENCY)
Facility: HOSPITAL | Age: 39
Discharge: HOME | End: 2024-10-25
Attending: STUDENT IN AN ORGANIZED HEALTH CARE EDUCATION/TRAINING PROGRAM
Payer: COMMERCIAL

## 2024-10-25 ENCOUNTER — ANESTHESIA EVENT (OUTPATIENT)
Dept: SURGERY | Facility: HOSPITAL | Age: 39
End: 2024-10-25
Payer: COMMERCIAL

## 2024-10-25 VITALS
HEART RATE: 77 BPM | TEMPERATURE: 100 F | OXYGEN SATURATION: 100 % | BODY MASS INDEX: 20.98 KG/M2 | RESPIRATION RATE: 16 BRPM | WEIGHT: 114 LBS | SYSTOLIC BLOOD PRESSURE: 103 MMHG | HEIGHT: 62 IN | DIASTOLIC BLOOD PRESSURE: 72 MMHG

## 2024-10-25 DIAGNOSIS — O03.4 INCOMPLETE ABORTION (HCC): ICD-10-CM

## 2024-10-25 DIAGNOSIS — O02.1: Primary | ICD-10-CM

## 2024-10-25 LAB
ALBUMIN SERPL-MCNC: 4.3 G/DL (ref 3.2–4.8)
ALBUMIN/GLOB SERPL: 1.4 {RATIO} (ref 1–2)
ALP LIVER SERPL-CCNC: 61 U/L
ALT SERPL-CCNC: 23 U/L
ANION GAP SERPL CALC-SCNC: 5 MMOL/L (ref 0–18)
AST SERPL-CCNC: 19 U/L (ref ?–34)
BASOPHILS # BLD AUTO: 0.03 X10(3) UL (ref 0–0.2)
BASOPHILS NFR BLD AUTO: 0.3 %
BILIRUB SERPL-MCNC: 0.5 MG/DL (ref 0.3–1.2)
BUN BLD-MCNC: 5 MG/DL (ref 9–23)
CALCIUM BLD-MCNC: 10.2 MG/DL (ref 8.7–10.4)
CHLORIDE SERPL-SCNC: 107 MMOL/L (ref 98–112)
CO2 SERPL-SCNC: 24 MMOL/L (ref 21–32)
CREAT BLD-MCNC: 0.63 MG/DL
EGFRCR SERPLBLD CKD-EPI 2021: 117 ML/MIN/1.73M2 (ref 60–?)
EOSINOPHIL # BLD AUTO: 0.07 X10(3) UL (ref 0–0.7)
EOSINOPHIL NFR BLD AUTO: 0.7 %
ERYTHROCYTE [DISTWIDTH] IN BLOOD BY AUTOMATED COUNT: 12.3 %
GLOBULIN PLAS-MCNC: 3 G/DL (ref 2–3.5)
GLUCOSE BLD-MCNC: 109 MG/DL (ref 70–99)
HCT VFR BLD AUTO: 35.9 %
HGB BLD-MCNC: 12.9 G/DL
IMM GRANULOCYTES # BLD AUTO: 0.03 X10(3) UL (ref 0–1)
IMM GRANULOCYTES NFR BLD: 0.3 %
LYMPHOCYTES # BLD AUTO: 0.64 X10(3) UL (ref 1–4)
LYMPHOCYTES NFR BLD AUTO: 6.3 %
MCH RBC QN AUTO: 30.3 PG (ref 26–34)
MCHC RBC AUTO-ENTMCNC: 35.9 G/DL (ref 31–37)
MCV RBC AUTO: 84.3 FL
MONOCYTES # BLD AUTO: 0.51 X10(3) UL (ref 0.1–1)
MONOCYTES NFR BLD AUTO: 5 %
NEUTROPHILS # BLD AUTO: 8.9 X10 (3) UL (ref 1.5–7.7)
NEUTROPHILS # BLD AUTO: 8.9 X10(3) UL (ref 1.5–7.7)
NEUTROPHILS NFR BLD AUTO: 87.4 %
OSMOLALITY SERPL CALC.SUM OF ELEC: 280 MOSM/KG (ref 275–295)
PLATELET # BLD AUTO: 169 10(3)UL (ref 150–450)
POTASSIUM SERPL-SCNC: 3.6 MMOL/L (ref 3.5–5.1)
PROT SERPL-MCNC: 7.3 G/DL (ref 5.7–8.2)
RBC # BLD AUTO: 4.26 X10(6)UL
RH BLOOD TYPE: POSITIVE
SODIUM SERPL-SCNC: 136 MMOL/L (ref 136–145)
WBC # BLD AUTO: 10.2 X10(3) UL (ref 4–11)

## 2024-10-25 PROCEDURE — 59812 TREATMENT OF MISCARRIAGE: CPT | Performed by: OBSTETRICS & GYNECOLOGY

## 2024-10-25 PROCEDURE — 76817 TRANSVAGINAL US OBSTETRIC: CPT | Performed by: STUDENT IN AN ORGANIZED HEALTH CARE EDUCATION/TRAINING PROGRAM

## 2024-10-25 PROCEDURE — 10D17ZZ EXTRACTION OF PRODUCTS OF CONCEPTION, RETAINED, VIA NATURAL OR ARTIFICIAL OPENING: ICD-10-PCS | Performed by: OBSTETRICS & GYNECOLOGY

## 2024-10-25 PROCEDURE — 76801 OB US < 14 WKS SINGLE FETUS: CPT | Performed by: STUDENT IN AN ORGANIZED HEALTH CARE EDUCATION/TRAINING PROGRAM

## 2024-10-25 RX ORDER — ONDANSETRON 2 MG/ML
4 INJECTION INTRAMUSCULAR; INTRAVENOUS EVERY 6 HOURS PRN
Status: DISCONTINUED | OUTPATIENT
Start: 2024-10-25 | End: 2024-10-26

## 2024-10-25 RX ORDER — SODIUM CHLORIDE 9 MG/ML
1000 INJECTION, SOLUTION INTRAVENOUS ONCE
Status: COMPLETED | OUTPATIENT
Start: 2024-10-25 | End: 2024-10-25

## 2024-10-25 RX ORDER — KETOROLAC TROMETHAMINE 30 MG/ML
INJECTION, SOLUTION INTRAMUSCULAR; INTRAVENOUS AS NEEDED
Status: DISCONTINUED | OUTPATIENT
Start: 2024-10-25 | End: 2024-10-25 | Stop reason: SURG

## 2024-10-25 RX ORDER — ACETAMINOPHEN 500 MG
1000 TABLET ORAL ONCE
Status: DISCONTINUED | OUTPATIENT
Start: 2024-10-25 | End: 2024-10-26

## 2024-10-25 RX ORDER — ONDANSETRON 2 MG/ML
INJECTION INTRAMUSCULAR; INTRAVENOUS AS NEEDED
Status: DISCONTINUED | OUTPATIENT
Start: 2024-10-25 | End: 2024-10-25 | Stop reason: SURG

## 2024-10-25 RX ORDER — SODIUM CHLORIDE, SODIUM LACTATE, POTASSIUM CHLORIDE, CALCIUM CHLORIDE 600; 310; 30; 20 MG/100ML; MG/100ML; MG/100ML; MG/100ML
INJECTION, SOLUTION INTRAVENOUS CONTINUOUS
Status: DISCONTINUED | OUTPATIENT
Start: 2024-10-25 | End: 2024-10-26

## 2024-10-25 RX ORDER — SODIUM CHLORIDE 9 MG/ML
INJECTION, SOLUTION INTRAVENOUS CONTINUOUS PRN
Status: DISCONTINUED | OUTPATIENT
Start: 2024-10-25 | End: 2024-10-25 | Stop reason: SURG

## 2024-10-25 RX ORDER — ONDANSETRON 2 MG/ML
4 INJECTION INTRAMUSCULAR; INTRAVENOUS ONCE
Status: COMPLETED | OUTPATIENT
Start: 2024-10-25 | End: 2024-10-25

## 2024-10-25 RX ORDER — SCOLOPAMINE TRANSDERMAL SYSTEM 1 MG/1
1 PATCH, EXTENDED RELEASE TRANSDERMAL ONCE
Status: DISCONTINUED | OUTPATIENT
Start: 2024-10-25 | End: 2024-10-26

## 2024-10-25 RX ORDER — MIDAZOLAM HYDROCHLORIDE 1 MG/ML
INJECTION INTRAMUSCULAR; INTRAVENOUS AS NEEDED
Status: DISCONTINUED | OUTPATIENT
Start: 2024-10-25 | End: 2024-10-25 | Stop reason: SURG

## 2024-10-25 RX ORDER — HYDROCODONE BITARTRATE AND ACETAMINOPHEN 5; 325 MG/1; MG/1
1 TABLET ORAL EVERY 4 HOURS PRN
Status: DISCONTINUED | OUTPATIENT
Start: 2024-10-25 | End: 2024-10-26

## 2024-10-25 RX ORDER — HYDROMORPHONE HYDROCHLORIDE 1 MG/ML
0.6 INJECTION, SOLUTION INTRAMUSCULAR; INTRAVENOUS; SUBCUTANEOUS EVERY 5 MIN PRN
Status: DISCONTINUED | OUTPATIENT
Start: 2024-10-25 | End: 2024-10-26

## 2024-10-25 RX ORDER — PROCHLORPERAZINE EDISYLATE 5 MG/ML
5 INJECTION INTRAMUSCULAR; INTRAVENOUS EVERY 8 HOURS PRN
Status: DISCONTINUED | OUTPATIENT
Start: 2024-10-25 | End: 2024-10-26

## 2024-10-25 RX ORDER — NALOXONE HYDROCHLORIDE 0.4 MG/ML
0.08 INJECTION, SOLUTION INTRAMUSCULAR; INTRAVENOUS; SUBCUTANEOUS AS NEEDED
Status: DISCONTINUED | OUTPATIENT
Start: 2024-10-25 | End: 2024-10-26

## 2024-10-25 RX ORDER — HYDROMORPHONE HYDROCHLORIDE 1 MG/ML
0.2 INJECTION, SOLUTION INTRAMUSCULAR; INTRAVENOUS; SUBCUTANEOUS EVERY 5 MIN PRN
Status: DISCONTINUED | OUTPATIENT
Start: 2024-10-25 | End: 2024-10-26

## 2024-10-25 RX ORDER — METHYLERGONOVINE MALEATE 0.2 MG/ML
INJECTION INTRAVENOUS AS NEEDED
Status: DISCONTINUED | OUTPATIENT
Start: 2024-10-25 | End: 2024-10-25 | Stop reason: SURG

## 2024-10-25 RX ORDER — MORPHINE SULFATE 4 MG/ML
4 INJECTION, SOLUTION INTRAMUSCULAR; INTRAVENOUS ONCE
Status: COMPLETED | OUTPATIENT
Start: 2024-10-25 | End: 2024-10-25

## 2024-10-25 RX ORDER — HYDROMORPHONE HYDROCHLORIDE 1 MG/ML
0.4 INJECTION, SOLUTION INTRAMUSCULAR; INTRAVENOUS; SUBCUTANEOUS EVERY 5 MIN PRN
Status: DISCONTINUED | OUTPATIENT
Start: 2024-10-25 | End: 2024-10-26

## 2024-10-25 RX ORDER — LIDOCAINE HYDROCHLORIDE 10 MG/ML
INJECTION, SOLUTION EPIDURAL; INFILTRATION; INTRACAUDAL; PERINEURAL AS NEEDED
Status: DISCONTINUED | OUTPATIENT
Start: 2024-10-25 | End: 2024-10-25 | Stop reason: SURG

## 2024-10-25 RX ORDER — HYDROCODONE BITARTRATE AND ACETAMINOPHEN 5; 325 MG/1; MG/1
2 TABLET ORAL EVERY 4 HOURS PRN
Status: DISCONTINUED | OUTPATIENT
Start: 2024-10-25 | End: 2024-10-26

## 2024-10-25 RX ADMIN — SODIUM CHLORIDE: 9 INJECTION, SOLUTION INTRAVENOUS at 19:39:00

## 2024-10-25 RX ADMIN — KETOROLAC TROMETHAMINE 15 MG: 30 INJECTION, SOLUTION INTRAMUSCULAR; INTRAVENOUS at 20:06:00

## 2024-10-25 RX ADMIN — LIDOCAINE HYDROCHLORIDE 20 MG: 10 INJECTION, SOLUTION EPIDURAL; INFILTRATION; INTRACAUDAL; PERINEURAL at 19:43:00

## 2024-10-25 RX ADMIN — METHYLERGONOVINE MALEATE 0.2 MG: 0.2 INJECTION INTRAVENOUS at 20:02:00

## 2024-10-25 RX ADMIN — MIDAZOLAM HYDROCHLORIDE 2 MG: 1 INJECTION INTRAMUSCULAR; INTRAVENOUS at 19:39:00

## 2024-10-25 RX ADMIN — SODIUM CHLORIDE: 9 INJECTION, SOLUTION INTRAVENOUS at 20:12:00

## 2024-10-25 RX ADMIN — ONDANSETRON 4 MG: 2 INJECTION INTRAMUSCULAR; INTRAVENOUS at 19:48:00

## 2024-10-25 NOTE — ED INITIAL ASSESSMENT (HPI)
Heavy vaginal bleeding and pain since last night . Patient is scheduled for D&C   on Monday . Patient is 11 weeks pregnant   but not viable . Unable to tolerate pain since last night

## 2024-10-25 NOTE — ED PROVIDER NOTES
Patient Seen in: [facility] Emergency Department      History     Chief Complaint   Patient presents with    Eval-G     Stated Complaint: vaginal bleeding, 11 weeks pregnant, non-viable scheduled for d & c next week    Subjective:   HPI    The patient is a 39-year-old female approximately 11 weeks pregnant with a nonviable pregnancy presenting for lower abdominal pain cramping.  She states she started having vaginal bleeding last night and is went through at least 12 pads.  She is scheduled for a D&C next week however is here today because she cannot tolerate the pain.      Objective:     Past Medical History:    Celiac disease (HCC)    COVID-19    WENT TO ER BUT NOT HOSPITALIZED, COLD/FLU SYMPTOMS.    Endometriosis    H/O pelvic ultrasound    2.1 cm heterogeneous mass posterior to the uterus which appears separate  from the uterus and could potentially represent the RT ovary which was not visualized. 2.2cm & 1.5cm hypoechoic LT adnexal masses which are measured separate from the LTt ovary which are isoechoic to the uterus & visualized ovary.1.8cm subserosal posterior uterine fibroid. 7mm endometrial stripe    History of MRI of pelvis    Findings consistent w/ focal adenomyosis LT posterior uterine body & fundus, along with  b/l ovarian endometriomas. Majority of the endometriomas are within the LT ovary; A tiny subcentimeter endometrioma is appreciated within the RT ovary. The subserosal location of the small LT posterior fundal adenomyoma raises the possibility that it represents subserosal endometriosis rather than adenomyosis.    Migraines    Pap smear for cervical cancer screening    Negative per Care Everywhere [facility]              Past Surgical History:   Procedure Laterality Date    Upper gi endoscopy,exam                  Social History     Socioeconomic History    Marital status:    Tobacco Use    Smoking status: Never     Passive exposure: Never    Smokeless tobacco: Never    Vaping Use    Vaping status: Never Used   Substance and Sexual Activity    Alcohol use: Never    Drug use: Never    Sexual activity: Yes     Partners: Male   Other Topics Concern    Caffeine Concern No    Exercise No    Seat Belt No    Special Diet No    Stress Concern No    Weight Concern No     Social Drivers of Health     Financial Resource Strain: Low Risk  (9/26/2024)    Financial Resource Strain     Difficulty of Paying Living Expenses: Not very hard     Med Affordability: No   Food Insecurity: No Food Insecurity (9/26/2024)    Food Insecurity     Food Insecurity: Never true   Transportation Needs: No Transportation Needs (9/26/2024)    Transportation Needs     Lack of Transportation: No   Stress: No Stress Concern Present (9/26/2024)    Stress     Feeling of Stress : No   Housing Stability: Low Risk  (9/26/2024)    Housing Stability     Housing Instability: No                  Physical Exam     ED Triage Vitals [10/25/24 1107]   /80   Pulse 77   Resp 18   Temp 97.8 °F (36.6 °C)   Temp src Oral   SpO2 96 %   O2 Device None (Room air)       Current Vitals:   Vital Signs  BP: 95/64  Pulse: 65  Resp: 18  Temp: 97.8 °F (36.6 °C)  Temp src: Oral  MAP (mmHg): 75    Oxygen Therapy  SpO2: 99 %  O2 Device: None (Room air)        Physical Exam  Vitals and nursing note reviewed.   Constitutional:       General: She is not in acute distress.     Appearance: Normal appearance.   HENT:      Head: Normocephalic.      Nose: Nose normal.      Mouth/Throat:      Mouth: Mucous membranes are moist.   Eyes:      Extraocular Movements: Extraocular movements intact.      Pupils: Pupils are equal, round, and reactive to light.   Cardiovascular:      Rate and Rhythm: Normal rate and regular rhythm.      Pulses: Normal pulses.      Heart sounds: Normal heart sounds.   Pulmonary:      Effort: Pulmonary effort is normal.   Abdominal:      General: Abdomen is flat. Bowel sounds are normal. There is no distension.      Palpations:  Abdomen is soft.      Tenderness: There is abdominal tenderness. There is no right CVA tenderness, left CVA tenderness, guarding or rebound.      Hernia: No hernia is present.   Musculoskeletal:         General: No swelling or tenderness. Normal range of motion.      Cervical back: Normal range of motion.   Skin:     General: Skin is warm and dry.   Neurological:      Mental Status: She is alert and oriented to person, place, and time. Mental status is at baseline.   Psychiatric:         Mood and Affect: Mood normal.             ED Course     Labs Reviewed   CBC WITH DIFFERENTIAL WITH PLATELET - Abnormal; Notable for the following components:       Result Value    Neutrophil Absolute Prelim 8.90 (*)     Neutrophil Absolute 8.90 (*)     Lymphocyte Absolute 0.64 (*)     All other components within normal limits   COMP METABOLIC PANEL (14) - Abnormal; Notable for the following components:    Glucose 109 (*)     BUN 5 (*)     All other components within normal limits   ABORH (BLOOD TYPE)                   MDM    Medical Decision Making      The differential includes the following  Retained products of conception, complete miscarriage, anemia, metabolic derangement    Pertinent comorbidities include  As listed above     Pertinent social history includes  As listed above     Labs  Hb is 12.9    US PREG 1ST TRIM W/EV (CPT=76801/96985)    Result Date: 10/25/2024  CONCLUSION:  Increased vascularity centered on the endometrial canal with mixed echogenicity may represent retained products of conception.  There is no fetal pole or gestational sac identified.  Hypoechoic structure of the left ovary is again identified and may represent an endometrioma.  Consider continued follow-up with pelvic ultrasound in approximately 3 months.   LOCATION:  [id number]   Dictated by (CST): Everett Rivers MD on 10/25/2024 at 2:42 PM     Finalized by (CST): Everett Rivers MD on 10/25/2024 at 2:47 PM          External data reviewed  Prior notes telephone  messages between the patient Dr. Wallis    Discussion of management with external providers  Dr. Burch - recommends US     ER course  Patient's initial vitals patient is afebrile hemodynamically stable however appears uncomfortable tender in the lower abdomen.  I have informed the patient need to do a pelvic exam however she would like pain medication first.  I discussed her case with her OB Dr. Liz Esposito who recommends obtaining ultrasound to visualize if there is any remaining products left given patient's report of bleeding.  Reassurances patient's hemoglobin which is normal at 12.9.  Patient is currently awaiting ultrasound.    2:59 PM  Ultrasound shows retained products of conception.  Patient is currently hemodynamically stable and pain has been controlled.  Her most recent pad that she was given after ultrasound does not show signs of new bleeding currently.  This was discussed with Dr. Liz Esposito.  She was able to speak with the OR and plans for D&C at 5 PM today.  I have relayed this to the patient.  She tells me she last had breakfast between 830 and 9 AM but then subsequently vomited.  She has been told that she should not have any food or water by mouth.  I have ordered continuous maintenance fluids.  Discussed with our charge nurse who spoke with same-day surgery who will be finding somewhere to place the patient until the OR is available presumably at 5 PM.  All of the patient and her 's questions have been answered to the best of my ability.  Disposition and Plan     Clinical Impression:  1. Retained products of conception, early pregnancy (HCC)         Disposition:  Send to or/cath/gi  10/25/2024  2:54 pm    Follow-up:  No follow-up provider specified.        Medications Prescribed:  Current Discharge Medication List              Supplementary Documentation:

## 2024-10-25 NOTE — TELEPHONE ENCOUNTER
Patient having severe pain 8/10 on pain scale, passed lots of clots last night/bleeding.     Advised to go to ER for evaluation  Patient verbalized understanding, agreed to and intend to comply with plan of care.    D&C scheduled 10/28/24

## 2024-10-25 NOTE — TELEPHONE ENCOUNTER
Patient scheduled for an D&C Monday.Pt in a lot of pain with bleeding.Patient wondering will she be able to have procedure done tomorrow instead of Monday?

## 2024-10-25 NOTE — ANESTHESIA PREPROCEDURE EVALUATION
PRE-OP EVALUATION    Patient Name: Tessie Hays    Admit Diagnosis: Retained products of conception, early pregnancy (HCC) [O02.1]    Pre-op Diagnosis: Incomplete  (HCC) [O03.4]    DILATION & CURETTAGE SUCTION    Anesthesia Procedure: DILATION & CURETTAGE SUCTION    Surgeons and Role:     * Tracie Altman, DO - Primary    Pre-op vitals reviewed.  Temp: 98.5 °F (36.9 °C)  Pulse: 68  Resp: 18  BP: 94/62  SpO2: 99 %  Body mass index is 20.85 kg/m².    Current medications reviewed.  Hospital Medications:   [COMPLETED] sodium chloride 0.9 % IV bolus 1,000 mL  1,000 mL Intravenous Once    [COMPLETED] morphINE PF 4 MG/ML injection 4 mg  4 mg Intravenous Once    [COMPLETED] ondansetron (Zofran) 4 MG/2ML injection 4 mg  4 mg Intravenous Once    [COMPLETED] sodium chloride 0.9% infusion 1,000 mL  1,000 mL Intravenous Once    [Transfer Hold] acetaminophen (Tylenol Extra Strength) tab 1,000 mg  1,000 mg Oral Once    [Transfer Hold] scopolamine (Transderm-Scop) 1 MG/3DAYS patch 1 patch  1 patch Transdermal Once    lactated ringers infusion   Intravenous Continuous    [START ON 10/28/2024] doxycycline hyclate (Vibramycin) 200 mg in sodium chloride 0.9% 250 mL IVPB  200 mg Intravenous Once       Outpatient Medications:   Prescriptions Prior to Admission[1]    Allergies: Wheat germ      Anesthesia Evaluation        Anesthetic Complications           GI/Hepatic/Renal    Negative GI/hepatic/renal ROS.                             Cardiovascular    Negative cardiovascular ROS.    Exercise tolerance: good     MET: >4                                           Endo/Other    Negative endo/other ROS.                              Pulmonary    Negative pulmonary ROS.                       Neuro/Psych    Negative neuro/psych ROS.                                  Past Surgical History:   Procedure Laterality Date    Upper gi endoscopy,exam       Social History     Socioeconomic History    Marital status:    Tobacco Use     Smoking status: Never     Passive exposure: Never    Smokeless tobacco: Never   Vaping Use    Vaping status: Never Used   Substance and Sexual Activity    Alcohol use: Never    Drug use: Never    Sexual activity: Yes     Partners: Male   Other Topics Concern    Caffeine Concern No    Exercise No    Seat Belt No    Special Diet No    Stress Concern No    Weight Concern No     History   Drug Use Unknown     Available pre-op labs reviewed.  Lab Results   Component Value Date    WBC 10.2 10/25/2024    RBC 4.26 10/25/2024    HGB 12.9 10/25/2024    HCT 35.9 10/25/2024    MCV 84.3 10/25/2024    MCH 30.3 10/25/2024    MCHC 35.9 10/25/2024    RDW 12.3 10/25/2024    .0 10/25/2024     Lab Results   Component Value Date     10/25/2024    K 3.6 10/25/2024     10/25/2024    CO2 24.0 10/25/2024    BUN 5 (L) 10/25/2024    CREATSERUM 0.63 10/25/2024     (H) 10/25/2024    CA 10.2 10/25/2024            Airway      Mallampati: II  Mouth opening: >3 FB  TM distance: 4 - 6 cm  Neck ROM: full Cardiovascular      Rhythm: regular  Rate: normal     Dental    Dentition appears grossly intact         Pulmonary    Pulmonary exam normal.                 Other findings              ASA: 1   Plan: general  NPO status verified and patient meets guidelines.    Post-procedure pain management plan discussed with surgeon and patient.      Plan/risks discussed with: patient  Use of blood product(s) discussed with: patient    Consented to blood products.          Present on Admission:  **None**             [1]   Medications Prior to Admission   Medication Sig Dispense Refill Last Dose/Taking    Multiple Vitamins-Minerals (MULTIVITAL OR) Take by mouth.       Cyanocobalamin (VITAMIN B-12 OR) Take by mouth.       IRON OR Take 1 tablet by mouth daily.

## 2024-10-26 NOTE — OPERATIVE REPORT
Pre-op Dx:Incomplete AB  Post-op Dx: same  Surgeon:   Procedure: suction D&C  Complications: none  Findings:10 cm uterus  Procedure note: Pt was taken to the O.R. Where anesthesia was obtained without difficulty. She was then prepped and draped in normal sterile fashion in dorsal lithotomy position. Weighted speculum was placed in patients vagina and single tooth tenaculum applied to anterior lip of the cervix. Cervix appeared to be dilated with large blood clot protruding dilated, uterus sounded to be 10 cm, and hysteroscope introduced with findings above.9 Mm suction curette introduced into the uterus and uterus ws cleared of products of conception. Sharp curettage was performed, all instruments removed from patients vagina, good hemostasis noted.

## 2024-10-26 NOTE — DISCHARGE INSTRUCTIONS
Home Care Instructions  DILATATION AND CURETTAGE (D&C)      1. Take it easy for the first 24 hours. Stay at home if possible and make sure someone is at home to help you or to report any symptoms or problems that you might have.    2. You may feel weak, dizzy, or a little lightheaded from the anesthesia during the first 24 hours. If so, stay in bed and rest and do not climb up and down the stairs. If you cannot completely avoid stairs, make sure that you have assistance.    3. Do not drive for 24 hours after surgery.    4. You may have spotting or discharge for the next few days. However, any heavy bleeding is abnormal and should be reported immediately.    5. Take you temperature once a day for the next 7 days and report to us if your temperature is up to 101 degrees or more in the absence of any common cold symptoms.    6. You may have some cramping, lower abdominal pains or shoulder pain for the first 24 hours. Usually two aspirin, Advil Ibuprofen or Tylenol every 4-6 hours takes care of this problem. If the pain persists or gets worse, notify the doctor immediately.    7. Avoid douching or intercourse for ten days. You may take a shower but do not take a bath for 10 days.    8. Please call in advance and make your appointment for a post-operative checkup at the office in 3-4 week.    9. Please do not hesitate to call if you have any problems or questions        [phone]    In the event that your decision changes regarding the disposition of your baby, please contact the [facility] at [phone] within one day of your procedure.    You received a drug called Toradol which is an Anti Inflammatory at: 8 PM     Do not take any Anti Inflammatory like Motrin, Advil, Aleve or Ibuprofen until after: 2 AM   Please report any suspected allergic reactions or bleeding issues to your doctor

## 2024-10-26 NOTE — ANESTHESIA POSTPROCEDURE EVALUATION
[facility]    Tessie Hays Patient Status:  Emergency   Age/Gender 39 year old female MRN [id number]   Location [facility] SURGERY Attending Tracie Altman DO   Hosp Day # 0 PCP No primary care provider on file.       Anesthesia Post-op Note    DILATION & CURETTAGE SUCTION    Procedure Summary       Date: 10/25/24 Room / Location:  MAIN OR  MAIN OR    Anesthesia Start:  Anesthesia Stop:     Procedure: DILATION & CURETTAGE SUCTION Diagnosis:       Incomplete  (HCC)      (Incomplete  (HCC) [O03.4])    Surgeons: Tracie Altman DO Anesthesiologist: Mitchell Bunn MD    Anesthesia Type: MAC ASA Status: 1            Anesthesia Type: MAC    Vitals Value Taken Time   BP 98/68 10/25/24 2017   Temp 99.7 10/25/24 2017   Pulse 83 10/25/24 2017   Resp 14 10/25/24 2017   SpO2 100 10/25/24 2017       Patient Location: Same Day Surgery    Anesthesia Type: MAC    Airway Patency: patent    Postop Pain Control: adequate    Mental Status: mildly sedated but able to meaningfully participate in the post-anesthesia evaluation    Nausea/Vomiting: none    Cardiopulmonary/Hydration status: stable euvolemic    Complications: no apparent anesthesia related complications    Postop vital signs: stable    Dental Exam: Unchanged from Preop    Patient to be discharged home when criteria met.

## 2024-10-26 NOTE — H&P
Tessie Hays is a 39 year old female  Patient's last menstrual period was 2024 (exact date).   Chief Complaint   Patient presents with    Eval-G   .  Patient presented to ED with heavy bleeding and pain, she was scheduled for D&C in 3 days for missed AB  OBSTETRICS HISTORY:  OB History    Para Term  AB Living   2 0 0 0 0 0   SAB IAB Ectopic Multiple Live Births   0 0 0 0 0      # Outcome Date GA Lbr Edwin/2nd Weight Sex Type Anes PTL Lv   2 Current            1                 GYNE HISTORY:  Periods regular monthly    History   Sexual Activity    Sexual activity: Yes    Partners: Male                 MEDICAL HISTORY:  Past Medical History:    Celiac disease (HCC)    COVID-19    WENT TO ER BUT NOT HOSPITALIZED, COLD/FLU SYMPTOMS.    Endometriosis    H/O pelvic ultrasound    2.1 cm heterogeneous mass posterior to the uterus which appears separate  from the uterus and could potentially represent the RT ovary which was not visualized. 2.2cm & 1.5cm hypoechoic LT adnexal masses which are measured separate from the LTt ovary which are isoechoic to the uterus & visualized ovary.1.8cm subserosal posterior uterine fibroid. 7mm endometrial stripe    History of MRI of pelvis    Findings consistent w/ focal adenomyosis LT posterior uterine body & fundus, along with  b/l ovarian endometriomas. Majority of the endometriomas are within the LT ovary; A tiny subcentimeter endometrioma is appreciated within the RT ovary. The subserosal location of the small LT posterior fundal adenomyoma raises the possibility that it represents subserosal endometriosis rather than adenomyosis.    Migraines    Pap smear for cervical cancer screening    Negative per Care Everywhere [facility]       SURGICAL HISTORY:  Past Surgical History:   Procedure Laterality Date    Upper gi endoscopy,exam         SOCIAL HISTORY:  Social History     Socioeconomic History    Marital status:      Spouse name: Not  on file    Number of children: Not on file    Years of education: Not on file    Highest education level: Not on file   Occupational History    Not on file   Tobacco Use    Smoking status: Never     Passive exposure: Never    Smokeless tobacco: Never   Vaping Use    Vaping status: Never Used   Substance and Sexual Activity    Alcohol use: Never    Drug use: Never    Sexual activity: Yes     Partners: Male   Other Topics Concern    Caffeine Concern No    Exercise No    Seat Belt No    Special Diet No    Stress Concern No    Weight Concern No   Social History Narrative    Not on file     Social Drivers of Health     Financial Resource Strain: Low Risk  (9/26/2024)    Financial Resource Strain     Difficulty of Paying Living Expenses: Not very hard     Med Affordability: No   Food Insecurity: No Food Insecurity (9/26/2024)    Food Insecurity     Food Insecurity: Never true   Transportation Needs: No Transportation Needs (9/26/2024)    Transportation Needs     Lack of Transportation: No     Car Seat: Not on file   Stress: No Stress Concern Present (9/26/2024)    Stress     Feeling of Stress : No   Housing Stability: Low Risk  (9/26/2024)    Housing Stability     Housing Instability: No     Housing Instability Emergency: Not on file     Crib or Bassinette: Not on file       FAMILY HISTORY:  Family History   Problem Relation Age of Onset    Kidney Disease Father     No Known Problems Mother     No Known Problems Maternal Grandmother     No Known Problems Maternal Grandfather     No Known Problems Paternal Grandmother     No Known Problems Paternal Grandfather     Breast Cancer Neg     Prostate Cancer Neg     Ovarian Cancer Neg     Uterine Cancer Neg     Pancreatic Cancer Neg     Colon Cancer Neg     Endometriosis Neg     Infertility Neg        MEDICATIONS:  No current outpatient medications on file.    ALLERGIES:  Allergies[1]      Review of Systems:  Constitutional:  Denies fatigue, night sweats, hot flashes  Eyes:   denies blurred or double vision  Cardiovascular:  denies chest pain or palpitations  Respiratory:  denies shortness of breath  Gastrointestinal:  denies heartburn, abdominal pain, diarrhea or constipation  Genitourinary:  denies dysuria, incontinence, abnormal vaginal discharge, vaginal itching  Musculoskeletal:  denies back pain.  Skin/Breast:  Denies any breast pain, lumps, or discharge.   Neurological:  denies headaches, extremity weakness or numbness.  Psychiatric: denies depression or anxiety.  Endocrine:   denies excessive thirst or urination.  Heme/Lymph:  denies history of anemia, easy bruising or bleeding.      PHYSICAL EXAM:   Constitutional: well developed, well nourished  Head/Face: normocephalic  Neck/Thyroid: thyroid symmetric, no thyromegaly, no nodules, no adenopathy  Lymphatic:no abnormal supraclavicular or axillary adenopathy is noted  Breast: normal without palpable masses, tenderness, asymmetry, nipple discharge, nipple retraction or skin changes  Abdomen:  soft, nontender, nondistended, no masses  Skin/Hair: no unusual rashes or bruises  Extremities: no edema, no cyanosis  Psychiatric:  Oriented to time, place, person and situation. Appropriate mood and affect    Pelvic Exam:  External Genitalia: normal appearance, hair distribution, and no lesions  Urethral Meatus:  normal in size, location, without lesions and prolapse  Bladder:  No fullness, masses or tenderness  Vagina:  Normal appearance without lesions, no abnormal discharge  Cervix:  Normal without tenderness on motion, moderate amount of blood at the os  Uterus: normal in size, contour, position, mobility, without tenderness  Adnexa: normal without masses or tenderness  Perineum: normal  Anus: no hemorroids     INDICATIONS:  Vaginal bleeding during pregnancy     TECHNIQUE:  Transabdominal and endovaginal pelvic ultrasound examinations were performed.     PATIENT STATED HISTORY: (As transcribed by Technologist)           MEASUREMENTS:      Gestational Age:               11 weeks, 6 days by dates  Fetal Heart Rate:               Not identified  Beebe Rump Length:       Not identified  Left Ovary:                       4.71 cm x 3.34 cm x 3.25 cm  Right Ovary:                     1.8 x 2.6 x 2.6 cm     FINDINGS:    GESTATIONAL SAC:  Not identified  FETAL POLE:  Not identified  YOLK SAC:  Not identified  CARDIAC ACTIVITY:  Not identified  UTERUS:  Uterus is normal in size.  Mixed echogenicity is centered on the endometrial canal with vascularity.  This may represent retained products of conception.  PLACENTA:  Not clearly visualized.     RIGHT OVARY:  1.8 x 2.6 x 2.6 cm.  Probable right corpus luteum measures up to 2.1 x 1.4 x 1.4 cm.  LEFT OVARY:  Stable appearance of a hypoechoic structure measuring in the range of 3.0 x 2.7 x 3.2 cm.  This could represent an endometrioma.    CUL-DE-SAC:  A small amount of free pelvic fluid is present.  CLINICAL AGE:  11 weeks, 6 days  SONOGRAPHIC AGE:  Not measurable  OTHER:  Negative.                   Impression   CONCLUSION:  Increased vascularity centered on the endometrial canal with mixed echogenicity may represent retained products of conception.  There is no fetal pole or gestational sac identified.     Hypoechoic structure of the left ovary is again identified and may represent an endometrioma.  Consider continued follow-up with pelvic ultrasound in approximately 3 months.       Assessment & Plan:  Diagnoses and all orders for this visit:    Incomplete ab  - suction D&C               [1]   Allergies  Allergen Reactions    Wheat Germ NAUSEA AND VOMITING

## 2024-11-06 ENCOUNTER — TELEPHONE (OUTPATIENT)
Dept: OBGYN UNIT | Facility: HOSPITAL | Age: 39
End: 2024-11-06

## 2024-11-11 ENCOUNTER — OFFICE VISIT (OUTPATIENT)
Facility: CLINIC | Age: 39
End: 2024-11-11
Payer: COMMERCIAL

## 2024-11-11 VITALS
WEIGHT: 113 LBS | BODY MASS INDEX: 21 KG/M2 | HEART RATE: 67 BPM | DIASTOLIC BLOOD PRESSURE: 64 MMHG | SYSTOLIC BLOOD PRESSURE: 112 MMHG

## 2024-11-11 DIAGNOSIS — Z09 POSTOP CHECK: Primary | ICD-10-CM

## 2024-11-11 NOTE — PROGRESS NOTES
Tessie Hays is a 39 year old female  Patient's last menstrual period was 2024 (exact date).   Chief Complaint   Patient presents with    Post-Op     DILATION & CURETTAGE SUCTION 10/25/24  -bleeding 7/-8 days after    .  Patient has no complaints  OBSTETRICS HISTORY:  OB History    Para Term  AB Living   2 0 0 0 0 0   SAB IAB Ectopic Multiple Live Births   0 0 0 0 0      # Outcome Date GA Lbr Edwin/2nd Weight Sex Type Anes PTL Lv   2 Current            1                 GYNE HISTORY:  Periods regular monthly    History   Sexual Activity    Sexual activity: Yes    Partners: Male                 MEDICAL HISTORY:  Past Medical History:    Celiac disease (HCC)    COVID-19    WENT TO ER BUT NOT HOSPITALIZED, COLD/FLU SYMPTOMS.    Endometriosis    H/O pelvic ultrasound    2.1 cm heterogeneous mass posterior to the uterus which appears separate  from the uterus and could potentially represent the RT ovary which was not visualized. 2.2cm & 1.5cm hypoechoic LT adnexal masses which are measured separate from the LTt ovary which are isoechoic to the uterus & visualized ovary.1.8cm subserosal posterior uterine fibroid. 7mm endometrial stripe    History of MRI of pelvis    Findings consistent w/ focal adenomyosis LT posterior uterine body & fundus, along with  b/l ovarian endometriomas. Majority of the endometriomas are within the LT ovary; A tiny subcentimeter endometrioma is appreciated within the RT ovary. The subserosal location of the small LT posterior fundal adenomyoma raises the possibility that it represents subserosal endometriosis rather than adenomyosis.    Migraines    Pap smear for cervical cancer screening    Negative per Care Everywhere [facility]       SURGICAL HISTORY:  Past Surgical History:   Procedure Laterality Date    D & c  10/25/2024    Upper gi endoscopy,exam         SOCIAL HISTORY:  Social History     Socioeconomic History    Marital status:       Spouse name: Not on file    Number of children: Not on file    Years of education: Not on file    Highest education level: Not on file   Occupational History    Not on file   Tobacco Use    Smoking status: Never     Passive exposure: Never    Smokeless tobacco: Never   Vaping Use    Vaping status: Never Used   Substance and Sexual Activity    Alcohol use: Never    Drug use: Never    Sexual activity: Yes     Partners: Male   Other Topics Concern    Caffeine Concern No    Exercise No    Seat Belt No    Special Diet No    Stress Concern No    Weight Concern No   Social History Narrative    Not on file     Social Drivers of Health     Financial Resource Strain: Low Risk  (9/26/2024)    Financial Resource Strain     Difficulty of Paying Living Expenses: Not very hard     Med Affordability: No   Food Insecurity: No Food Insecurity (9/26/2024)    Food Insecurity     Food Insecurity: Never true   Transportation Needs: No Transportation Needs (9/26/2024)    Transportation Needs     Lack of Transportation: No     Car Seat: Not on file   Stress: No Stress Concern Present (9/26/2024)    Stress     Feeling of Stress : No   Housing Stability: Low Risk  (9/26/2024)    Housing Stability     Housing Instability: No     Housing Instability Emergency: Not on file     Crib or Bassinette: Not on file       FAMILY HISTORY:  Family History   Problem Relation Age of Onset    Kidney Disease Father     No Known Problems Mother     No Known Problems Maternal Grandmother     No Known Problems Maternal Grandfather     No Known Problems Paternal Grandmother     No Known Problems Paternal Grandfather     Breast Cancer Neg     Prostate Cancer Neg     Ovarian Cancer Neg     Uterine Cancer Neg     Pancreatic Cancer Neg     Colon Cancer Neg     Endometriosis Neg     Infertility Neg        MEDICATIONS:    Current Outpatient Medications:     Multiple Vitamins-Minerals (MULTIVITAL OR), Take by mouth., Disp: , Rfl:     Cyanocobalamin (VITAMIN B-12 OR),  Take by mouth., Disp: , Rfl:     IRON OR, Take 1 tablet by mouth daily., Disp: , Rfl:     ALLERGIES:  Allergies[1]      PHYSICAL EXAM:   Pelvic Exam:  External Genitalia: normal appearance, hair distribution, and no lesions  Urethral Meatus:  normal in size, location, without lesions and prolapse  Bladder:  No fullness, masses or tenderness  Vagina:  Normal appearance without lesions, no abnormal discharge  Cervix:  Normal without tenderness on motion  Uterus: normal in size, contour, position, mobility, without tenderness  Adnexa: normal without masses or tenderness  Perineum: normal  Anus: no hemorroids     Assessment & Plan:  1. Postop check  *- doing well                 [1]   Allergies  Allergen Reactions    Wheat Germ NAUSEA AND VOMITING

## 2024-12-03 ENCOUNTER — ULTRASOUND ENCOUNTER (OUTPATIENT)
Facility: CLINIC | Age: 39
End: 2024-12-03
Payer: COMMERCIAL

## 2025-01-19 ENCOUNTER — PATIENT MESSAGE (OUTPATIENT)
Dept: INTERNAL MEDICINE CLINIC | Facility: CLINIC | Age: 40
End: 2025-01-19

## 2025-01-22 NOTE — TELEPHONE ENCOUNTER
Upon dialing pt, noticed pt does not have PCP listed and has never been seen by our office. Spoke with pt. Pt stated her  sees Dr. Bonds. Pt stated she took a home test and is pregnant and believes that's why she was feeling weak. Pt stated she now \"feels fine\" and has been resting and drinking water. Notified if she wants to establish care with our office, needs to come in for appointment. Pt agreeable to make new pt appointment and transferred call to  to make new pt appointment.

## 2025-02-03 ENCOUNTER — APPOINTMENT (OUTPATIENT)
Dept: ULTRASOUND IMAGING | Facility: HOSPITAL | Age: 40
End: 2025-02-03
Payer: COMMERCIAL

## 2025-02-03 ENCOUNTER — HOSPITAL ENCOUNTER (EMERGENCY)
Facility: HOSPITAL | Age: 40
Discharge: HOME | End: 2025-02-03
Attending: EMERGENCY MEDICINE
Payer: COMMERCIAL

## 2025-02-03 VITALS
OXYGEN SATURATION: 98 % | BODY MASS INDEX: 21 KG/M2 | SYSTOLIC BLOOD PRESSURE: 103 MMHG | RESPIRATION RATE: 20 BRPM | WEIGHT: 115 LBS | DIASTOLIC BLOOD PRESSURE: 69 MMHG | HEART RATE: 81 BPM | TEMPERATURE: 98 F

## 2025-02-03 DIAGNOSIS — O20.9 VAGINAL BLEEDING IN PREGNANCY, FIRST TRIMESTER (HCC): Primary | ICD-10-CM

## 2025-02-03 LAB
ALBUMIN SERPL-MCNC: 4.6 G/DL (ref 3.2–4.8)
ALBUMIN/GLOB SERPL: 1.6 {RATIO} (ref 1–2)
ALP LIVER SERPL-CCNC: 53 U/L
ALT SERPL-CCNC: 22 U/L
ANION GAP SERPL CALC-SCNC: 9 MMOL/L (ref 0–18)
AST SERPL-CCNC: 19 U/L (ref ?–34)
B-HCG SERPL-ACNC: ABNORMAL MIU/ML
BASOPHILS # BLD AUTO: 0.04 X10(3) UL (ref 0–0.2)
BASOPHILS NFR BLD AUTO: 0.5 %
BILIRUB SERPL-MCNC: 0.4 MG/DL (ref 0.3–1.2)
BUN BLD-MCNC: 7 MG/DL (ref 9–23)
CALCIUM BLD-MCNC: 10 MG/DL (ref 8.7–10.6)
CHLORIDE SERPL-SCNC: 102 MMOL/L (ref 98–112)
CO2 SERPL-SCNC: 26 MMOL/L (ref 21–32)
CREAT BLD-MCNC: 0.65 MG/DL
EGFRCR SERPLBLD CKD-EPI 2021: 116 ML/MIN/1.73M2 (ref 60–?)
EOSINOPHIL # BLD AUTO: 0.09 X10(3) UL (ref 0–0.7)
EOSINOPHIL NFR BLD AUTO: 1.2 %
ERYTHROCYTE [DISTWIDTH] IN BLOOD BY AUTOMATED COUNT: 11.7 %
GLOBULIN PLAS-MCNC: 2.8 G/DL (ref 2–3.5)
GLUCOSE BLD-MCNC: 104 MG/DL (ref 70–99)
HCT VFR BLD AUTO: 38.6 %
HGB BLD-MCNC: 13.8 G/DL
IMM GRANULOCYTES # BLD AUTO: 0.06 X10(3) UL (ref 0–1)
IMM GRANULOCYTES NFR BLD: 0.8 %
LYMPHOCYTES # BLD AUTO: 1.21 X10(3) UL (ref 1–4)
LYMPHOCYTES NFR BLD AUTO: 16.3 %
MCH RBC QN AUTO: 30.1 PG (ref 26–34)
MCHC RBC AUTO-ENTMCNC: 35.8 G/DL (ref 31–37)
MCV RBC AUTO: 84.3 FL
MONOCYTES # BLD AUTO: 0.42 X10(3) UL (ref 0.1–1)
MONOCYTES NFR BLD AUTO: 5.6 %
NEUTROPHILS # BLD AUTO: 5.62 X10 (3) UL (ref 1.5–7.7)
NEUTROPHILS # BLD AUTO: 5.62 X10(3) UL (ref 1.5–7.7)
NEUTROPHILS NFR BLD AUTO: 75.6 %
OSMOLALITY SERPL CALC.SUM OF ELEC: 282 MOSM/KG (ref 275–295)
PLATELET # BLD AUTO: 219 10(3)UL (ref 150–450)
POTASSIUM SERPL-SCNC: 3.6 MMOL/L (ref 3.5–5.1)
PROT SERPL-MCNC: 7.4 G/DL (ref 5.7–8.2)
RBC # BLD AUTO: 4.58 X10(6)UL
SODIUM SERPL-SCNC: 137 MMOL/L (ref 136–145)
WBC # BLD AUTO: 7.4 X10(3) UL (ref 4–11)

## 2025-02-03 PROCEDURE — 80053 COMPREHEN METABOLIC PANEL: CPT

## 2025-02-03 PROCEDURE — 99284 EMERGENCY DEPT VISIT MOD MDM: CPT

## 2025-02-03 PROCEDURE — 84702 CHORIONIC GONADOTROPIN TEST: CPT | Performed by: EMERGENCY MEDICINE

## 2025-02-03 PROCEDURE — 99285 EMERGENCY DEPT VISIT HI MDM: CPT

## 2025-02-03 PROCEDURE — 85025 COMPLETE CBC W/AUTO DIFF WBC: CPT

## 2025-02-03 PROCEDURE — 85025 COMPLETE CBC W/AUTO DIFF WBC: CPT | Performed by: EMERGENCY MEDICINE

## 2025-02-03 PROCEDURE — 84702 CHORIONIC GONADOTROPIN TEST: CPT

## 2025-02-03 PROCEDURE — 76817 TRANSVAGINAL US OBSTETRIC: CPT | Performed by: EMERGENCY MEDICINE

## 2025-02-03 PROCEDURE — 76801 OB US < 14 WKS SINGLE FETUS: CPT | Performed by: EMERGENCY MEDICINE

## 2025-02-03 PROCEDURE — 80053 COMPREHEN METABOLIC PANEL: CPT | Performed by: EMERGENCY MEDICINE

## 2025-02-03 NOTE — ED INITIAL ASSESSMENT (HPI)
39YF c/c of [place] Pt state that she currently 6 weeks preg and been spotting for the last 10 days. Pt denies any cramping or clots at this time

## 2025-02-03 NOTE — ED PROVIDER NOTES
Patient Seen in: [facility] Emergency Department      History     Chief Complaint   Patient presents with    Eval-G     +preg, approx 6 weeks, spotting     Stated Complaint:     Subjective:   HPI      39-year-old female G2, P0 at approximately 6 weeks presents with spotting for the past week along with intermittent lower abdominal cramping.   states he went to immediate care and was told to come to the emergency department.  Patient states she had a nonviable pregnancy at about 8 weeks last year and needed D&C.  Denies passing any clots or tissue.  Denies urinary symptoms.  Denies nausea or vomiting.    Objective:     Past Medical History:    Celiac disease (HCC)    COVID-19    WENT TO ER BUT NOT HOSPITALIZED, COLD/FLU SYMPTOMS.    Endometriosis    H/O pelvic ultrasound    2.1 cm heterogeneous mass posterior to the uterus which appears separate  from the uterus and could potentially represent the RT ovary which was not visualized. 2.2cm & 1.5cm hypoechoic LT adnexal masses which are measured separate from the LTt ovary which are isoechoic to the uterus & visualized ovary.1.8cm subserosal posterior uterine fibroid. 7mm endometrial stripe    History of MRI of pelvis    Findings consistent w/ focal adenomyosis LT posterior uterine body & fundus, along with  b/l ovarian endometriomas. Majority of the endometriomas are within the LT ovary; A tiny subcentimeter endometrioma is appreciated within the RT ovary. The subserosal location of the small LT posterior fundal adenomyoma raises the possibility that it represents subserosal endometriosis rather than adenomyosis.    Migraines    Pap smear for cervical cancer screening    Negative per Care Everywhere [facility]              Past Surgical History:   Procedure Laterality Date    D & c  10/25/2024    Upper gi endoscopy,exam                  No pertinent social history.                Physical Exam     ED Triage Vitals [02/03/25 1356]   /69    Pulse 81   Resp 20   Temp 98 °F (36.7 °C)   Temp src Temporal   SpO2 98 %   O2 Device None (Room air)       Current Vitals:   Vital Signs  BP: 103/69  Pulse: 81  Resp: 20  Temp: 98 °F (36.7 °C)  Temp src: Temporal    Oxygen Therapy  SpO2: 98 %  O2 Device: None (Room air)        Physical Exam  Vitals and nursing note reviewed.   Constitutional:       Appearance: She is well-developed.   HENT:      Head: Normocephalic and atraumatic.      Mouth/Throat:      Mouth: Mucous membranes are moist.   Eyes:      General: No scleral icterus.  Cardiovascular:      Rate and Rhythm: Normal rate and regular rhythm.   Pulmonary:      Effort: Pulmonary effort is normal.      Breath sounds: Normal breath sounds.   Abdominal:      Palpations: Abdomen is soft.      Tenderness: There is no abdominal tenderness.   Skin:     General: Skin is warm and dry.   Neurological:      General: No focal deficit present.      Mental Status: She is alert and oriented to person, place, and time.      Cranial Nerves: No cranial nerve deficit.      Motor: No weakness.   Psychiatric:         Mood and Affect: Mood normal.         Behavior: Behavior normal.             ED Course     Labs Reviewed   HCG, BETA SUBUNIT (QUANT PREGNANCY TEST) - Abnormal; Notable for the following components:       Result Value    Hcg Quantitative 81,959.9 (*)     All other components within normal limits   COMP METABOLIC PANEL (14) - Abnormal; Notable for the following components:    Glucose 104 (*)     BUN 7 (*)     All other components within normal limits   CBC WITH DIFFERENTIAL WITH PLATELET            US PREG 1ST TRIM W/EV (CPT=76801/89258)    Result Date: 2/3/2025  CONCLUSION:  1. SINGLE LIVE INTRAUTERINE GESTATION MEASURING APPROXIMATELY 6 WEEKS 3 DAYS BY ULTRASOUND.  FETAL HEART RATE  BEATS PER MINUTE. 2. APPROXIMATELY 13 MM SUB GESTATIONAL/CHORIONIC HEMATOMA. 3. GROSSLY STABLE HYPOECHOIC STRUCTURE WITHIN THE LEFT OVARY MEASURING APPROXIMATELY 2.7 CM.  THIS  REMAINS INDETERMINATE THOUGH POSSIBLY REPRESENTS AN ENDOMETRIOMA. 4. NEW SIMILAR SMALLER STRUCTURE TO THE ABOVE DESCRIBED LESION WITHIN THE LEFT OVARY MEASURING UP TO 1.4 CM.  THIS MAY ALSO REPRESENT A POSSIBLE ENDOMETRIOMA VERSUS SMALL HEMORRHAGIC/PROTEINACEOUS CYST.  CONSIDER FOLLOW-UP ULTRASOUND EVALUATION TO DOCUMENT STABILITY.  AN MRI WITH CONTRAST MAY BE OBTAINED FOLLOWING PREGNANCY AS CLINICALLY INDICATED.   LOCATION:  [facility]   Dictated by (CST): David Ramos MD on 2025 at 3:58 PM     Finalized by (CST): David Ramos MD on 2025 at 4:03 PM             MDM      39-year-old female G2, P0 at approximately 6 weeks presents with spotting for the past week along with intermittent lower abdominal cramping.      Differential includes but is not limited to threatened , missed AB, subchorionic hemorrhage, ectopic pregnancy    Labs show beta-hCG 97827.  Labs otherwise unremarkable.  Chart review shows blood type O+ on 10/25/2024.    Independent interpretation of previously ultrasound shows live IUP.  Radiology report reviewed as above measuring gestation at 6 weeks 3 days with small subgestational/chorionic hematoma.  Report also notes to hypoechoic structures in the left ovary possible endometrioma versus cyst.  Patient informed of findings and need for close follow-up with OB.  Return precautions discussed    Medical Decision Making  Amount and/or Complexity of Data Reviewed  Independent Historian: spouse     Details: See HPI  Labs: ordered. Decision-making details documented in ED Course.  Radiology: ordered and independent interpretation performed. Decision-making details documented in ED Course.        Disposition and Plan     Clinical Impression:  1. Vaginal bleeding in pregnancy, first trimester (HCC)         Disposition:  Discharge  2/3/2025  4:47 pm    Follow-up:  Sugar Howard MD  [address] [address]  [phone]    Schedule an appointment as soon as  possible for a visit in 3 day(s)            Medications Prescribed:  Current Discharge Medication List              Supplementary Documentation:

## 2025-02-24 ENCOUNTER — LAB REQUISITION (OUTPATIENT)
Dept: LAB | Age: 40
End: 2025-02-24

## 2025-02-24 DIAGNOSIS — K90.0 CELIAC DISEASE (CMD): ICD-10-CM

## 2025-02-24 PROCEDURE — PSEU12831 TISSUE TRANSGLUTAMINASE ANTIBODIES IGA: Performed by: CLINICAL MEDICAL LABORATORY

## 2025-02-24 PROCEDURE — 82728 ASSAY OF FERRITIN: CPT | Performed by: CLINICAL MEDICAL LABORATORY

## 2025-02-24 PROCEDURE — PSEU8306 VITAMIN B12: Performed by: CLINICAL MEDICAL LABORATORY

## 2025-02-24 PROCEDURE — 82607 VITAMIN B-12: CPT | Performed by: CLINICAL MEDICAL LABORATORY

## 2025-02-24 PROCEDURE — 86364 TISS TRNSGLTMNASE EA IG CLAS: CPT | Performed by: CLINICAL MEDICAL LABORATORY

## 2025-02-24 PROCEDURE — PSEU8259 FERRITIN: Performed by: CLINICAL MEDICAL LABORATORY

## 2025-02-25 LAB
FERRITIN SERPL-MCNC: 109 NG/ML (ref 8–252)
TTG IGA SER IA-ACNC: 15 U/ML
VIT B12 SERPL-MCNC: 870 PG/ML (ref 211–911)

## 2025-05-23 ENCOUNTER — LAB REQUISITION (OUTPATIENT)
Dept: LAB | Age: 40
End: 2025-05-23

## 2025-06-04 ENCOUNTER — HOSPITAL ENCOUNTER (OUTPATIENT)
Facility: HOSPITAL | Age: 40
Discharge: HOME | End: 2025-06-04
Attending: OBSTETRICS & GYNECOLOGY | Admitting: OBSTETRICS & GYNECOLOGY
Payer: COMMERCIAL

## 2025-06-04 VITALS
WEIGHT: 121 LBS | OXYGEN SATURATION: 97 % | DIASTOLIC BLOOD PRESSURE: 57 MMHG | TEMPERATURE: 98 F | HEART RATE: 76 BPM | SYSTOLIC BLOOD PRESSURE: 98 MMHG | RESPIRATION RATE: 18 BRPM | BODY MASS INDEX: 22 KG/M2

## 2025-06-04 PROCEDURE — 99212 OFFICE O/P EST SF 10 MIN: CPT

## 2025-06-04 NOTE — NST
Nonstress Test   Patient: Tessie Hays    Gestation: 23w2d    NST:       Variability: Moderate           Accelerations: Yes           Decelerations: None            Baseline: 140 BPM           Uterine Irritability: No           Contractions: Not present                                        Acoustic Stimulator: No           Nonstress Test Interpretation: Appropriate for gestational age           Nonstress Test Second Interpretation: Appropriate for gestational age                     Additional Comments

## 2025-06-04 NOTE — NST
Physician Evaluation      NST Interpretation: Appropriate for gestational age    Disposition:   Discharged    Comments:    reassurance    Sugar Howard MD

## 2025-06-04 NOTE — PROGRESS NOTES
Pt is a 39 year old female admitted to TRG1/TRG1-A.     Chief Complaint   Patient presents with    Decreased Fetal Movement     Since last night, had very minimal movement this morning. Denies any ob complaints, no ctx or vaginal bleeding or leaking. Active fetal movement heard on monitor       Pt is  23w2d intra-uterine pregnancy.  History obtained, consents signed. Oriented to room, staff, and plan of care.

## 2025-06-04 NOTE — DISCHARGE INSTRUCTIONS
Discharge Instructions    Diet: regular  Activity: Normal activity         General Instructions    Call your OB doctor if: Fluid leaking from your vagina;Decrease in fetal movement;Vaginal or rectal pressure;Vaginal bleeding;Uterine contractions 10 minutes or closer for 1 to 2 hours;Uterine contractions increasing in intensity and frequency;Temperature greater than 100F

## 2025-07-30 ENCOUNTER — HOSPITAL ENCOUNTER (OUTPATIENT)
Facility: HOSPITAL | Age: 40
Discharge: HOME | End: 2025-07-30
Attending: OBSTETRICS & GYNECOLOGY | Admitting: OBSTETRICS & GYNECOLOGY

## 2025-07-30 VITALS
RESPIRATION RATE: 16 BRPM | DIASTOLIC BLOOD PRESSURE: 61 MMHG | BODY MASS INDEX: 23.74 KG/M2 | TEMPERATURE: 98 F | HEIGHT: 62 IN | WEIGHT: 129 LBS | HEART RATE: 71 BPM | SYSTOLIC BLOOD PRESSURE: 100 MMHG

## 2025-07-30 PROCEDURE — 59025 FETAL NON-STRESS TEST: CPT

## 2025-07-30 PROCEDURE — 99212 OFFICE O/P EST SF 10 MIN: CPT

## (undated) DEVICE — PREMIUM WET SKIN PREP TRAY: Brand: MEDLINE INDUSTRIES, INC.

## (undated) DEVICE — SOLUTION IRRIG 1000ML 0.9% NACL USP BTL

## (undated) DEVICE — PACK GYNE CUSTOM

## (undated) DEVICE — HOSE CONN 18IN SER FOR BERK SAFETOUCH COLL

## (undated) DEVICE — SLEEVE COMPR MD KNEE LEN SGL USE KENDALL SCD

## (undated) DEVICE — SYSTEM COLL W/ TISS TRAP INCLUDE COLL CANSTR

## (undated) DEVICE — GLOVE SUR 6.5 SENSICARE PI PIP CRM PWD F

## (undated) DEVICE — Device